# Patient Record
Sex: MALE | Race: WHITE | NOT HISPANIC OR LATINO | URBAN - METROPOLITAN AREA
[De-identification: names, ages, dates, MRNs, and addresses within clinical notes are randomized per-mention and may not be internally consistent; named-entity substitution may affect disease eponyms.]

---

## 2017-03-04 ENCOUNTER — EMERGENCY (EMERGENCY)
Facility: HOSPITAL | Age: 82
LOS: 1 days | Discharge: HOME | End: 2017-03-04
Attending: EMERGENCY MEDICINE
Payer: MEDICARE

## 2017-03-04 VITALS
TEMPERATURE: 102 F | HEART RATE: 90 BPM | SYSTOLIC BLOOD PRESSURE: 207 MMHG | HEIGHT: 66 IN | WEIGHT: 169.98 LBS | OXYGEN SATURATION: 98 % | DIASTOLIC BLOOD PRESSURE: 91 MMHG | RESPIRATION RATE: 20 BRPM

## 2017-03-04 DIAGNOSIS — Z95.5 PRESENCE OF CORONARY ANGIOPLASTY IMPLANT AND GRAFT: ICD-10-CM

## 2017-03-04 DIAGNOSIS — Z95.1 PRESENCE OF AORTOCORONARY BYPASS GRAFT: Chronic | ICD-10-CM

## 2017-03-04 DIAGNOSIS — J09.X2 INFLUENZA DUE TO IDENTIFIED NOVEL INFLUENZA A VIRUS WITH OTHER RESPIRATORY MANIFESTATIONS: ICD-10-CM

## 2017-03-04 DIAGNOSIS — T82.898A OTHER SPECIFIED COMPLICATION OF VASCULAR PROSTHETIC DEVICES, IMPLANTS AND GRAFTS, INITIAL ENCOUNTER: Chronic | ICD-10-CM

## 2017-03-04 LAB
ALBUMIN SERPL ELPH-MCNC: 4.2 G/DL — SIGNIFICANT CHANGE UP (ref 3.3–5.2)
ALP SERPL-CCNC: 104 U/L — SIGNIFICANT CHANGE UP (ref 40–120)
ALT FLD-CCNC: 11 U/L — SIGNIFICANT CHANGE UP
ANION GAP SERPL CALC-SCNC: 14 MMOL/L — SIGNIFICANT CHANGE UP (ref 5–17)
APPEARANCE UR: CLEAR — SIGNIFICANT CHANGE UP
APTT BLD: 29.6 SEC — SIGNIFICANT CHANGE UP (ref 27.5–37.4)
AST SERPL-CCNC: 19 U/L — SIGNIFICANT CHANGE UP
BACTERIA # UR AUTO: ABNORMAL
BILIRUB SERPL-MCNC: 0.6 MG/DL — SIGNIFICANT CHANGE UP (ref 0.4–2)
BILIRUB UR-MCNC: NEGATIVE — SIGNIFICANT CHANGE UP
BUN SERPL-MCNC: 36 MG/DL — HIGH (ref 8–20)
CALCIUM SERPL-MCNC: 9.6 MG/DL — SIGNIFICANT CHANGE UP (ref 8.6–10.2)
CHLORIDE SERPL-SCNC: 103 MMOL/L — SIGNIFICANT CHANGE UP (ref 98–107)
CO2 SERPL-SCNC: 23 MMOL/L — SIGNIFICANT CHANGE UP (ref 22–29)
COLOR SPEC: YELLOW — SIGNIFICANT CHANGE UP
CREAT SERPL-MCNC: 2.04 MG/DL — HIGH (ref 0.5–1.3)
DIFF PNL FLD: ABNORMAL
EOSINOPHIL NFR BLD AUTO: 1 % — SIGNIFICANT CHANGE UP (ref 0–6)
FLUAV H1 2009 PAND RNA SPEC QL NAA+PROBE: DETECTED
GLUCOSE SERPL-MCNC: 80 MG/DL — SIGNIFICANT CHANGE UP (ref 70–115)
GLUCOSE UR QL: NEGATIVE MG/DL — SIGNIFICANT CHANGE UP
GRAN CASTS # UR COMP ASSIST: ABNORMAL /LPF
HCT VFR BLD CALC: 39.2 % — LOW (ref 42–52)
HGB BLD-MCNC: 12.9 G/DL — LOW (ref 14–18)
INR BLD: 1.41 RATIO — HIGH (ref 0.88–1.16)
KETONES UR-MCNC: NEGATIVE — SIGNIFICANT CHANGE UP
LEUKOCYTE ESTERASE UR-ACNC: NEGATIVE — SIGNIFICANT CHANGE UP
LYMPHOCYTES # BLD AUTO: 12 % — LOW (ref 20–55)
MCHC RBC-ENTMCNC: 30.9 PG — SIGNIFICANT CHANGE UP (ref 27–31)
MCHC RBC-ENTMCNC: 32.9 G/DL — SIGNIFICANT CHANGE UP (ref 32–36)
MCV RBC AUTO: 94 FL — SIGNIFICANT CHANGE UP (ref 80–94)
METAMYELOCYTES # FLD: 1 % — HIGH (ref 0–0)
MONOCYTES NFR BLD AUTO: 6 % — SIGNIFICANT CHANGE UP (ref 3–10)
NEUTROPHILS NFR BLD AUTO: 71 % — SIGNIFICANT CHANGE UP (ref 37–73)
NEUTS BAND # BLD: 7 % — SIGNIFICANT CHANGE UP (ref 0–8)
NITRITE UR-MCNC: NEGATIVE — SIGNIFICANT CHANGE UP
PH UR: 5 — SIGNIFICANT CHANGE UP (ref 4.8–8)
PLAT MORPH BLD: NORMAL — SIGNIFICANT CHANGE UP
PLATELET # BLD AUTO: 214 K/UL — SIGNIFICANT CHANGE UP (ref 150–400)
POTASSIUM SERPL-MCNC: 5.3 MMOL/L — SIGNIFICANT CHANGE UP (ref 3.5–5.3)
POTASSIUM SERPL-SCNC: 5.3 MMOL/L — SIGNIFICANT CHANGE UP (ref 3.5–5.3)
PROT SERPL-MCNC: 7.8 G/DL — SIGNIFICANT CHANGE UP (ref 6.6–8.7)
PROT UR-MCNC: 30 MG/DL
PROTHROM AB SERPL-ACNC: 15.6 SEC — HIGH (ref 10–13.1)
RAPID RVP RESULT: DETECTED
RBC # BLD: 4.17 M/UL — LOW (ref 4.6–6.2)
RBC # FLD: 12.6 % — SIGNIFICANT CHANGE UP (ref 11–15.6)
RBC BLD AUTO: NORMAL — SIGNIFICANT CHANGE UP
SODIUM SERPL-SCNC: 140 MMOL/L — SIGNIFICANT CHANGE UP (ref 135–145)
SP GR SPEC: 1.01 — SIGNIFICANT CHANGE UP (ref 1.01–1.02)
UROBILINOGEN FLD QL: NEGATIVE MG/DL — SIGNIFICANT CHANGE UP
VARIANT LYMPHS # BLD: 2 % — SIGNIFICANT CHANGE UP (ref 0–6)
WBC # BLD: 5.9 K/UL — SIGNIFICANT CHANGE UP (ref 4.8–10.8)
WBC # FLD AUTO: 5.9 K/UL — SIGNIFICANT CHANGE UP (ref 4.8–10.8)
WBC UR QL: SIGNIFICANT CHANGE UP

## 2017-03-04 PROCEDURE — 71010: CPT | Mod: 26

## 2017-03-04 PROCEDURE — 74176 CT ABD & PELVIS W/O CONTRAST: CPT

## 2017-03-04 PROCEDURE — 87581 M.PNEUMON DNA AMP PROBE: CPT

## 2017-03-04 PROCEDURE — 74176 CT ABD & PELVIS W/O CONTRAST: CPT | Mod: 26

## 2017-03-04 PROCEDURE — 87040 BLOOD CULTURE FOR BACTERIA: CPT

## 2017-03-04 PROCEDURE — 99284 EMERGENCY DEPT VISIT MOD MDM: CPT | Mod: 25

## 2017-03-04 PROCEDURE — 80053 COMPREHEN METABOLIC PANEL: CPT

## 2017-03-04 PROCEDURE — 81001 URINALYSIS AUTO W/SCOPE: CPT

## 2017-03-04 PROCEDURE — 87633 RESP VIRUS 12-25 TARGETS: CPT

## 2017-03-04 PROCEDURE — 87086 URINE CULTURE/COLONY COUNT: CPT

## 2017-03-04 PROCEDURE — 85730 THROMBOPLASTIN TIME PARTIAL: CPT

## 2017-03-04 PROCEDURE — 85027 COMPLETE CBC AUTOMATED: CPT

## 2017-03-04 PROCEDURE — 71045 X-RAY EXAM CHEST 1 VIEW: CPT

## 2017-03-04 PROCEDURE — 99284 EMERGENCY DEPT VISIT MOD MDM: CPT

## 2017-03-04 PROCEDURE — 87486 CHLMYD PNEUM DNA AMP PROBE: CPT

## 2017-03-04 PROCEDURE — 87798 DETECT AGENT NOS DNA AMP: CPT

## 2017-03-04 PROCEDURE — 85610 PROTHROMBIN TIME: CPT

## 2017-03-04 RX ORDER — ACETAMINOPHEN 500 MG
975 TABLET ORAL ONCE
Qty: 0 | Refills: 0 | Status: COMPLETED | OUTPATIENT
Start: 2017-03-04 | End: 2017-03-04

## 2017-03-04 RX ORDER — SODIUM CHLORIDE 9 MG/ML
1000 INJECTION INTRAMUSCULAR; INTRAVENOUS; SUBCUTANEOUS ONCE
Qty: 0 | Refills: 0 | Status: COMPLETED | OUTPATIENT
Start: 2017-03-04 | End: 2017-03-04

## 2017-03-04 RX ORDER — ACETAMINOPHEN 500 MG
3 TABLET ORAL
Qty: 100 | Refills: 0 | OUTPATIENT
Start: 2017-03-04

## 2017-03-04 RX ORDER — SODIUM CHLORIDE 9 MG/ML
2000 INJECTION INTRAMUSCULAR; INTRAVENOUS; SUBCUTANEOUS ONCE
Qty: 0 | Refills: 0 | Status: COMPLETED | OUTPATIENT
Start: 2017-03-04 | End: 2017-03-04

## 2017-03-04 RX ADMIN — Medication 975 MILLIGRAM(S): at 22:20

## 2017-03-04 RX ADMIN — SODIUM CHLORIDE 1000 MILLILITER(S): 9 INJECTION INTRAMUSCULAR; INTRAVENOUS; SUBCUTANEOUS at 16:40

## 2017-03-04 RX ADMIN — Medication 75 MILLIGRAM(S): at 22:20

## 2017-03-04 RX ADMIN — SODIUM CHLORIDE 2000 MILLILITER(S): 9 INJECTION INTRAMUSCULAR; INTRAVENOUS; SUBCUTANEOUS at 22:21

## 2017-03-04 NOTE — ED PROVIDER NOTE - PROGRESS NOTE DETAILS
received signout from Dr. Gates. pt found to have influenza. Therefore, we start tamiflu, give antipyretics, and iv hydration. pt daughter advised to have pt follow up with PMD regarding renal insufficiency. no electrolyte abnormalities

## 2017-03-04 NOTE — ED ADULT NURSE REASSESSMENT NOTE - NS ED NURSE REASSESS COMMENT FT1
received report from sha yang and assumed care of pt. pt sitting calm in bed. a and o x3. johnson. breathing even and unlabored. b/l ronchi to all fields. cap refill brisk. skin w/d/i. + and = peripheral pulses. no le edema. nsr on cm. abdomen soft nontender nondistended. pt has no complaints at this time. awaiting results for dispo. will continue to monitor.
pt  with daughter at bedside s/p po contrast awaiting CT. pt in no distress at this time will continue to monitor

## 2017-03-04 NOTE — ED PROVIDER NOTE - OBJECTIVE STATEMENT
89 yo wm pmh with bodyaches and "shakes :"  for the last week; pt also noted lower abdominal pain ; fever in ed; no vomiting or diarrhea

## 2017-03-04 NOTE — ED ADULT TRIAGE NOTE - CHIEF COMPLAINT QUOTE
pt alert and awake, normal baseline, BIBA c/o hands "shaking" states has been going on for a week, denies chest pain/sob, denies fever/chills.

## 2017-03-05 VITALS
SYSTOLIC BLOOD PRESSURE: 135 MMHG | RESPIRATION RATE: 16 BRPM | HEART RATE: 84 BPM | DIASTOLIC BLOOD PRESSURE: 84 MMHG | TEMPERATURE: 98 F | OXYGEN SATURATION: 98 %

## 2017-03-05 LAB
CULTURE RESULTS: NO GROWTH — SIGNIFICANT CHANGE UP
SPECIMEN SOURCE: SIGNIFICANT CHANGE UP

## 2017-03-09 LAB
CULTURE RESULTS: SIGNIFICANT CHANGE UP
CULTURE RESULTS: SIGNIFICANT CHANGE UP
SPECIMEN SOURCE: SIGNIFICANT CHANGE UP
SPECIMEN SOURCE: SIGNIFICANT CHANGE UP

## 2017-08-02 ENCOUNTER — EMERGENCY (EMERGENCY)
Facility: HOSPITAL | Age: 82
LOS: 1 days | Discharge: HOME | End: 2017-08-02
Attending: EMERGENCY MEDICINE
Payer: MEDICARE

## 2017-08-02 VITALS
HEART RATE: 58 BPM | OXYGEN SATURATION: 96 % | SYSTOLIC BLOOD PRESSURE: 207 MMHG | WEIGHT: 130.07 LBS | DIASTOLIC BLOOD PRESSURE: 99 MMHG | RESPIRATION RATE: 18 BRPM | HEIGHT: 66 IN

## 2017-08-02 VITALS
SYSTOLIC BLOOD PRESSURE: 188 MMHG | HEART RATE: 82 BPM | DIASTOLIC BLOOD PRESSURE: 96 MMHG | RESPIRATION RATE: 24 BRPM | OXYGEN SATURATION: 97 %

## 2017-08-02 DIAGNOSIS — Z95.1 PRESENCE OF AORTOCORONARY BYPASS GRAFT: Chronic | ICD-10-CM

## 2017-08-02 DIAGNOSIS — T82.898A OTHER SPECIFIED COMPLICATION OF VASCULAR PROSTHETIC DEVICES, IMPLANTS AND GRAFTS, INITIAL ENCOUNTER: Chronic | ICD-10-CM

## 2017-08-02 PROCEDURE — 71045 X-RAY EXAM CHEST 1 VIEW: CPT

## 2017-08-02 PROCEDURE — 71010: CPT | Mod: 26

## 2017-08-02 PROCEDURE — 99283 EMERGENCY DEPT VISIT LOW MDM: CPT

## 2017-08-02 PROCEDURE — 99283 EMERGENCY DEPT VISIT LOW MDM: CPT | Mod: 25

## 2017-08-02 NOTE — ED PROVIDER NOTE - OBJECTIVE STATEMENT
89 yo M presents to ED with daughter s/p reported choking episode at [facility] approx 1400 today.  Pt was eating cake when he choked, but was able to "spit it out".  Pt has been his usual self since.  Pt denies any assoc abd pain, SOB, chest pain or N/V.  Pt denies any other c/o

## 2017-08-02 NOTE — ED ADULT TRIAGE NOTE - CHIEF COMPLAINT QUOTE
Patient BIBA, states was choking on piece of cake earlier, has resolved at this time however states still feels like "coughing", patient appears well, not able to tolerate secretions, seen by Dr Osorio upon ED arrival

## 2017-08-02 NOTE — ED ADULT NURSE NOTE - OBJECTIVE STATEMENT
Pt A&OX3, amb ad rocío, states he was at adult day care today and choked with cake this morning.  Pt states he had been drooling and was having pain with difficulty swallowing.  At time of my assessment, pt denies any pain and no drooling noted.  O2 sat of 97% on RA.  NAD.  Clear bsb, no stridor, abd soft nondistended, nontender, moving all ext well.

## 2017-08-02 NOTE — ED PROVIDER NOTE - CONSTITUTIONAL, MLM
normal... Well appearing, well nourished, elderly M awake, alert, oriented to person, place, time/situation and in no apparent distress

## 2017-08-04 NOTE — ED POST DISCHARGE NOTE - RESULT SUMMARY
LLL opacity/infiltrate- Pt feels well. No fevers, cough or SOB. pt instructed to f/u PMD for a repeat CXR

## 2018-04-11 ENCOUNTER — EMERGENCY (EMERGENCY)
Facility: HOSPITAL | Age: 83
LOS: 1 days | Discharge: HOME | End: 2018-04-11
Attending: EMERGENCY MEDICINE
Payer: MEDICARE

## 2018-04-11 VITALS
SYSTOLIC BLOOD PRESSURE: 182 MMHG | TEMPERATURE: 99 F | OXYGEN SATURATION: 98 % | DIASTOLIC BLOOD PRESSURE: 64 MMHG | RESPIRATION RATE: 18 BRPM | HEART RATE: 65 BPM

## 2018-04-11 VITALS
DIASTOLIC BLOOD PRESSURE: 71 MMHG | OXYGEN SATURATION: 99 % | WEIGHT: 169.98 LBS | RESPIRATION RATE: 18 BRPM | HEIGHT: 67 IN | SYSTOLIC BLOOD PRESSURE: 198 MMHG | HEART RATE: 60 BPM | TEMPERATURE: 98 F

## 2018-04-11 DIAGNOSIS — Z23 ENCOUNTER FOR IMMUNIZATION: ICD-10-CM

## 2018-04-11 DIAGNOSIS — Z95.5 PRESENCE OF CORONARY ANGIOPLASTY IMPLANT AND GRAFT: ICD-10-CM
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
## 2018-04-11 DIAGNOSIS — S01.111A LACERATION WITHOUT FOREIGN BODY OF RIGHT EYELID AND PERIOCULAR AREA, INITIAL ENCOUNTER: ICD-10-CM

## 2018-04-11 DIAGNOSIS — Z79.891 LONG TERM (CURRENT) USE OF OPIATE ANALGESIC: ICD-10-CM

## 2018-04-11 DIAGNOSIS — S09.90XA UNSPECIFIED INJURY OF HEAD, INITIAL ENCOUNTER: ICD-10-CM

## 2018-04-11 DIAGNOSIS — S50.311A ABRASION OF RIGHT ELBOW, INITIAL ENCOUNTER: ICD-10-CM
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
## 2018-04-11 DIAGNOSIS — Z95.1 PRESENCE OF AORTOCORONARY BYPASS GRAFT: Chronic | ICD-10-CM
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
## 2018-04-11 DIAGNOSIS — R01.1 CARDIAC MURMUR, UNSPECIFIED: ICD-10-CM

## 2018-04-11 DIAGNOSIS — S80.211A ABRASION, RIGHT KNEE, INITIAL ENCOUNTER: ICD-10-CM

## 2018-04-11 DIAGNOSIS — T82.898A OTHER SPECIFIED COMPLICATION OF VASCULAR PROSTHETIC DEVICES, IMPLANTS AND GRAFTS, INITIAL ENCOUNTER: Chronic | ICD-10-CM

## 2018-04-11 PROCEDURE — 72125 CT NECK SPINE W/O DYE: CPT

## 2018-04-11 PROCEDURE — 73070 X-RAY EXAM OF ELBOW: CPT

## 2018-04-11 PROCEDURE — 72125 CT NECK SPINE W/O DYE: CPT | Mod: 26

## 2018-04-11 PROCEDURE — 99284 EMERGENCY DEPT VISIT MOD MDM: CPT | Mod: 25

## 2018-04-11 PROCEDURE — 12013 RPR F/E/E/N/L/M 2.6-5.0 CM: CPT

## 2018-04-11 PROCEDURE — 90471 IMMUNIZATION ADMIN: CPT

## 2018-04-11 PROCEDURE — 73070 X-RAY EXAM OF ELBOW: CPT | Mod: 26,RT

## 2018-04-11 PROCEDURE — 90715 TDAP VACCINE 7 YRS/> IM: CPT

## 2018-04-11 PROCEDURE — 70450 CT HEAD/BRAIN W/O DYE: CPT

## 2018-04-11 PROCEDURE — 70450 CT HEAD/BRAIN W/O DYE: CPT | Mod: 26

## 2018-04-11 RX ORDER — TETANUS TOXOID, REDUCED DIPHTHERIA TOXOID AND ACELLULAR PERTUSSIS VACCINE, ADSORBED 5; 2.5; 8; 8; 2.5 [IU]/.5ML; [IU]/.5ML; UG/.5ML; UG/.5ML; UG/.5ML
0.5 SUSPENSION INTRAMUSCULAR ONCE
Qty: 0 | Refills: 0 | Status: COMPLETED | OUTPATIENT
Start: 2018-04-11 | End: 2018-04-11

## 2018-04-11 RX ADMIN — TETANUS TOXOID, REDUCED DIPHTHERIA TOXOID AND ACELLULAR PERTUSSIS VACCINE, ADSORBED 0.5 MILLILITER(S): 5; 2.5; 8; 8; 2.5 SUSPENSION INTRAMUSCULAR at 09:51

## 2018-04-11 NOTE — ED PROVIDER NOTE - CONSTITUTIONAL, MLM
normal... Well appearing, well nourished, awake, alert, oriented to person, place, time/situation and in no apparent distress. elderly male

## 2018-04-11 NOTE — ED PROVIDER NOTE - PROGRESS NOTE DETAILS
CT and X-ray results as noted.  Laceration repaired by PA with good closure/hemostasis.  Pt's daughter in ED to take pt home.  Rx head injury instructions, wound care instructions and suture removal in 5 days with PMD/Dr. Roth.  Rx Tylenol prn pain

## 2018-04-11 NOTE — ED ADULT TRIAGE NOTE - CHIEF COMPLAINT QUOTE
pt alert and awake x3, BIBA s/p fall from standing height, pt states, I tripped and fell and kissed the pavement, denies LOC, denies blood thinners.

## 2018-04-11 NOTE — ED PROVIDER NOTE - CARE PLAN
Principal Discharge DX:	Fall  Secondary Diagnosis:	Head injuries, initial encounter  Secondary Diagnosis:	Facial laceration, initial encounter

## 2018-04-11 NOTE — ED PROVIDER NOTE - OBJECTIVE STATEMENT
89 y/o M pt with hx of cataract surgery presents to ED c/o head pain and right elbow pain s/p trip and fall in driveway. no neck pain, visual changes, abdominal pain. Nonsmoker, no EtOH. Pt had breakfast today.  PMD: Dr. Roth

## 2018-04-16 ENCOUNTER — TRANSCRIPTION ENCOUNTER (OUTPATIENT)
Age: 83
End: 2018-04-16

## 2019-02-05 ENCOUNTER — INPATIENT (INPATIENT)
Facility: HOSPITAL | Age: 84
LOS: 6 days | Discharge: HOME | DRG: 689 | End: 2019-02-12
Attending: HOSPITALIST | Admitting: HOSPITALIST
Payer: MEDICARE

## 2019-02-05 VITALS
DIASTOLIC BLOOD PRESSURE: 74 MMHG | HEIGHT: 69 IN | RESPIRATION RATE: 20 BRPM | SYSTOLIC BLOOD PRESSURE: 185 MMHG | HEART RATE: 70 BPM | OXYGEN SATURATION: 99 % | TEMPERATURE: 98 F | WEIGHT: 139.99 LBS

## 2019-02-05 DIAGNOSIS — Z95.1 PRESENCE OF AORTOCORONARY BYPASS GRAFT: Chronic | ICD-10-CM

## 2019-02-05 DIAGNOSIS — T82.898A OTHER SPECIFIED COMPLICATION OF VASCULAR PROSTHETIC DEVICES, IMPLANTS AND GRAFTS, INITIAL ENCOUNTER: Chronic | ICD-10-CM

## 2019-02-05 LAB
ACETONE SERPL-MCNC: NEGATIVE — SIGNIFICANT CHANGE UP
ALBUMIN SERPL ELPH-MCNC: 3.7 G/DL — SIGNIFICANT CHANGE UP (ref 3.3–5.2)
ALP SERPL-CCNC: 126 U/L — HIGH (ref 40–120)
ALT FLD-CCNC: 11 U/L — SIGNIFICANT CHANGE UP
ANION GAP SERPL CALC-SCNC: 12 MMOL/L — SIGNIFICANT CHANGE UP (ref 5–17)
APPEARANCE UR: CLEAR — SIGNIFICANT CHANGE UP
AST SERPL-CCNC: 9 U/L — SIGNIFICANT CHANGE UP
BASOPHILS # BLD AUTO: 0 K/UL — SIGNIFICANT CHANGE UP (ref 0–0.2)
BASOPHILS NFR BLD AUTO: 0.2 % — SIGNIFICANT CHANGE UP (ref 0–2)
BILIRUB SERPL-MCNC: 0.3 MG/DL — LOW (ref 0.4–2)
BILIRUB UR-MCNC: NEGATIVE — SIGNIFICANT CHANGE UP
BUN SERPL-MCNC: 46 MG/DL — HIGH (ref 8–20)
CALCIUM SERPL-MCNC: 9 MG/DL — SIGNIFICANT CHANGE UP (ref 8.6–10.2)
CHLORIDE SERPL-SCNC: 94 MMOL/L — LOW (ref 98–107)
CK SERPL-CCNC: 60 U/L — SIGNIFICANT CHANGE UP (ref 30–200)
CO2 SERPL-SCNC: 24 MMOL/L — SIGNIFICANT CHANGE UP (ref 22–29)
COLOR SPEC: YELLOW — SIGNIFICANT CHANGE UP
CREAT SERPL-MCNC: 2.5 MG/DL — HIGH (ref 0.5–1.3)
DIFF PNL FLD: ABNORMAL
EOSINOPHIL # BLD AUTO: 0.1 K/UL — SIGNIFICANT CHANGE UP (ref 0–0.5)
EOSINOPHIL NFR BLD AUTO: 1.4 % — SIGNIFICANT CHANGE UP (ref 0–5)
EPI CELLS # UR: SIGNIFICANT CHANGE UP
GAS PNL BLDV: SIGNIFICANT CHANGE UP
GLUCOSE SERPL-MCNC: 679 MG/DL — CRITICAL HIGH (ref 70–115)
GLUCOSE UR QL: 1000 MG/DL
HCO3 BLDV-SCNC: 23 MMOL/L — SIGNIFICANT CHANGE UP (ref 21–29)
HCT VFR BLD CALC: 36.8 % — LOW (ref 42–52)
HGB BLD-MCNC: 12.3 G/DL — LOW (ref 14–18)
KETONES UR-MCNC: NEGATIVE — SIGNIFICANT CHANGE UP
LEUKOCYTE ESTERASE UR-ACNC: ABNORMAL
LIDOCAIN IGE QN: 105 U/L — HIGH (ref 22–51)
LYMPHOCYTES # BLD AUTO: 0.9 K/UL — LOW (ref 1–4.8)
LYMPHOCYTES # BLD AUTO: 15.5 % — LOW (ref 20–55)
MAGNESIUM SERPL-MCNC: 2.4 MG/DL — SIGNIFICANT CHANGE UP (ref 1.6–2.6)
MCHC RBC-ENTMCNC: 30.3 PG — SIGNIFICANT CHANGE UP (ref 27–31)
MCHC RBC-ENTMCNC: 33.4 G/DL — SIGNIFICANT CHANGE UP (ref 32–36)
MCV RBC AUTO: 90.6 FL — SIGNIFICANT CHANGE UP (ref 80–94)
MONOCYTES # BLD AUTO: 0.4 K/UL — SIGNIFICANT CHANGE UP (ref 0–0.8)
MONOCYTES NFR BLD AUTO: 7.7 % — SIGNIFICANT CHANGE UP (ref 3–10)
NEUTROPHILS # BLD AUTO: 4.4 K/UL — SIGNIFICANT CHANGE UP (ref 1.8–8)
NEUTROPHILS NFR BLD AUTO: 73.8 % — HIGH (ref 37–73)
NITRITE UR-MCNC: NEGATIVE — SIGNIFICANT CHANGE UP
PCO2 BLDV: 56 MMHG — HIGH (ref 35–50)
PH BLDV: 7.28 — LOW (ref 7.32–7.43)
PH UR: 7 — SIGNIFICANT CHANGE UP (ref 5–8)
PLATELET # BLD AUTO: 226 K/UL — SIGNIFICANT CHANGE UP (ref 150–400)
PO2 BLDV: 41 MMHG — SIGNIFICANT CHANGE UP (ref 25–45)
POTASSIUM SERPL-MCNC: 5.2 MMOL/L — SIGNIFICANT CHANGE UP (ref 3.5–5.3)
POTASSIUM SERPL-SCNC: 5.2 MMOL/L — SIGNIFICANT CHANGE UP (ref 3.5–5.3)
PROT SERPL-MCNC: 7.3 G/DL — SIGNIFICANT CHANGE UP (ref 6.6–8.7)
PROT UR-MCNC: 30 MG/DL
RBC # BLD: 4.06 M/UL — LOW (ref 4.6–6.2)
RBC # FLD: 12.5 % — SIGNIFICANT CHANGE UP (ref 11–15.6)
RBC CASTS # UR COMP ASSIST: SIGNIFICANT CHANGE UP /HPF (ref 0–4)
SAO2 % BLDV: 77 % — SIGNIFICANT CHANGE UP
SODIUM SERPL-SCNC: 130 MMOL/L — LOW (ref 135–145)
SP GR SPEC: 1 — LOW (ref 1.01–1.02)
TSH SERPL-MCNC: 0.8 UIU/ML — SIGNIFICANT CHANGE UP (ref 0.27–4.2)
UROBILINOGEN FLD QL: NEGATIVE MG/DL — SIGNIFICANT CHANGE UP
WBC # BLD: 5.9 K/UL — SIGNIFICANT CHANGE UP (ref 4.8–10.8)
WBC # FLD AUTO: 5.9 K/UL — SIGNIFICANT CHANGE UP (ref 4.8–10.8)
WBC UR QL: ABNORMAL

## 2019-02-05 PROCEDURE — 99285 EMERGENCY DEPT VISIT HI MDM: CPT

## 2019-02-05 RX ORDER — SODIUM CHLORIDE 9 MG/ML
1000 INJECTION INTRAMUSCULAR; INTRAVENOUS; SUBCUTANEOUS ONCE
Qty: 0 | Refills: 0 | Status: COMPLETED | OUTPATIENT
Start: 2019-02-05 | End: 2019-02-05

## 2019-02-05 RX ORDER — INSULIN HUMAN 100 [IU]/ML
12 INJECTION, SOLUTION SUBCUTANEOUS ONCE
Qty: 0 | Refills: 0 | Status: COMPLETED | OUTPATIENT
Start: 2019-02-05 | End: 2019-02-05

## 2019-02-05 RX ADMIN — INSULIN HUMAN 12 UNIT(S): 100 INJECTION, SOLUTION SUBCUTANEOUS at 22:34

## 2019-02-05 RX ADMIN — SODIUM CHLORIDE 2000 MILLILITER(S): 9 INJECTION INTRAMUSCULAR; INTRAVENOUS; SUBCUTANEOUS at 21:00

## 2019-02-05 NOTE — ED PROVIDER NOTE - PROGRESS NOTE DETAILS
Spoke with daughter, noticed acute change in mental status over the past week, lost glucometer, not taking meds. Pt not safe to go home as per daughter. I agree with daughter's assessment.

## 2019-02-05 NOTE — ED ADULT NURSE NOTE - OBJECTIVE STATEMENT
Pt. brought in by daughter for intermittent confusion at night over the last few weeks worsening today.  Daughter brought pt. to urgent care and sugar found to be HI and wouldn't read.  Pt. brought in by daughter following urgent care.

## 2019-02-05 NOTE — ED ADULT TRIAGE NOTE - CHIEF COMPLAINT QUOTE
Pt BIBA from urgent care for hyperglycemia.  As per EMS BS read HIGH.  Daughter brought pt to  for recent episodes of confusion.  Pt currently A&Ox2.  Denies physical complaints.

## 2019-02-05 NOTE — ED PROVIDER NOTE - OBJECTIVE STATEMENT
A 89 year old male pt with a hx of diabetes, on insulin, presents to the ED c/o AMS, hyperglycemia. As per the pt's daughter, the pt was found to not be acting his normal self today and was taken to urgent care for glucose check. Pt was found to have glucose reading of "high" and was brought to the ED. In the ED, the pt is acting normally and admits to not taking his insulin properly over the past few days. denies fever. denies HA or neck pain. no chest pain or sob. no abd pain. no n/v/d. no urinary f/u/d. no back pain. no motor or sensory deficits. denies illicit drug use. no recent travel. no rash. no other acute issues symptoms or concerns

## 2019-02-05 NOTE — ED PROVIDER NOTE - NEUROLOGICAL, MLM
neuro: CN II - XII intact, EOMI, PERRL, no papilledema, 5/5 muscle strength x 4 extremities, no sensory deficits, 2+ dtr globally, negative babinski, no ataxic gait, normal MARCIAL and FNT, normal romberg

## 2019-02-05 NOTE — ED ADULT NURSE NOTE - NS ED NOTE ABUSE SUSPICION NEGLECT YN
Verified Results  COMP METABOLIC PNL 09Mar2018 12:01AM [facility]     Test Name Result Flag Reference   SODIUM 145 mmol/L  135-145   POTASSIUM 4.1 mmol/L  3.4-5.1   CHLORIDE 109 mmol/L H    CARBON DIOXIDE 28 mmol/L  21-32   ANION GAP 12 mmol/L  10-20   GLUCOSE 94 mg/dl  65-99   BUN 16 mg/dl  6-20   CREATININE 0.80 mg/dl  0.51-0.95   GFR EST.AFRICAN AMER 77     eGFR 60 - 89 mL/min/1.73m2 = Mild decrease in kidney function.   GFR EST.NONAFRI AMER 67     eGFR 60 - 89 mL/min/1.73m2 = Mild decrease in kidney function.   BUN/CREATININE RATIO 20  7-25   BILIRUBIN TOTAL 0.5 mg/dl  0.2-1.0   GOT/AST 16 Units/L  <38   ALKALINE PHOSPHATASE 71 Units/L     ALBUMIN 3.9 g/dl  3.6-5.1   TOTAL PROTEIN 7.2 g/dl  6.4-8.2   GLOBULIN (CALCULATED) 3.3 g/dl  2.0-4.0   A/G RATIO 1.2  1.0-2.4   CALCIUM 9.7 mg/dl  8.4-10.2   GPT/ALT 20 Units/L  <79   FASTING STATUS UNKNOWN hrs       CBC WITHOUT DIFFERENTIAL 09Mar2018 12:01AM [facility] JULI[facility]     Test Name Result Flag Reference   RDW-CV 13.8 %  11.0-15.0   PLATELET COUNT 274 K/mcL  140-450   WHITE BLOOD COUNT 7.4 K/mcL  4.2-11.0   RED CELL COUNT 4.59 mil/mcL  4.00-5.20   HEMOGLOBIN 13.2 g/dl  12.0-15.5   HEMATOCRIT 41.8 %  36.0-46.5   MEAN CORPUSCULAR VOLUME 91.1 fL  78.0-100.0   MEAN CORPUSCULAR HEMOGLOBIN 28.8 pg  26.0-34.0   MEAN CORPUSCULAR HGB CONC 31.6 g/dl L 32.0-36.5       
No

## 2019-02-06 DIAGNOSIS — R41.82 ALTERED MENTAL STATUS, UNSPECIFIED: ICD-10-CM

## 2019-02-06 PROBLEM — E11.9 TYPE 2 DIABETES MELLITUS WITHOUT COMPLICATIONS: Chronic | Status: ACTIVE | Noted: 2018-04-11

## 2019-02-06 PROBLEM — N40.0 BENIGN PROSTATIC HYPERPLASIA WITHOUT LOWER URINARY TRACT SYMPTOMS: Chronic | Status: ACTIVE | Noted: 2017-03-04

## 2019-02-06 LAB
ANION GAP SERPL CALC-SCNC: 12 MMOL/L — SIGNIFICANT CHANGE UP (ref 5–17)
BUN SERPL-MCNC: 39 MG/DL — HIGH (ref 8–20)
CALCIUM SERPL-MCNC: 9 MG/DL — SIGNIFICANT CHANGE UP (ref 8.6–10.2)
CHLORIDE SERPL-SCNC: 103 MMOL/L — SIGNIFICANT CHANGE UP (ref 98–107)
CO2 SERPL-SCNC: 26 MMOL/L — SIGNIFICANT CHANGE UP (ref 22–29)
CREAT SERPL-MCNC: 2.01 MG/DL — HIGH (ref 0.5–1.3)
GLUCOSE BLDC GLUCOMTR-MCNC: 127 MG/DL — HIGH (ref 70–99)
GLUCOSE BLDC GLUCOMTR-MCNC: 163 MG/DL — HIGH (ref 70–99)
GLUCOSE BLDC GLUCOMTR-MCNC: 228 MG/DL — HIGH (ref 70–99)
GLUCOSE BLDC GLUCOMTR-MCNC: 256 MG/DL — HIGH (ref 70–99)
GLUCOSE SERPL-MCNC: 161 MG/DL — HIGH (ref 70–115)
HBA1C BLD-MCNC: 11 % — HIGH (ref 4–5.6)
HCT VFR BLD CALC: 34.9 % — LOW (ref 42–52)
HGB BLD-MCNC: 11.8 G/DL — LOW (ref 14–18)
MCHC RBC-ENTMCNC: 30.3 PG — SIGNIFICANT CHANGE UP (ref 27–31)
MCHC RBC-ENTMCNC: 33.8 G/DL — SIGNIFICANT CHANGE UP (ref 32–36)
MCV RBC AUTO: 89.5 FL — SIGNIFICANT CHANGE UP (ref 80–94)
PLATELET # BLD AUTO: 228 K/UL — SIGNIFICANT CHANGE UP (ref 150–400)
POTASSIUM SERPL-MCNC: 4.2 MMOL/L — SIGNIFICANT CHANGE UP (ref 3.5–5.3)
POTASSIUM SERPL-SCNC: 4.2 MMOL/L — SIGNIFICANT CHANGE UP (ref 3.5–5.3)
RBC # BLD: 3.9 M/UL — LOW (ref 4.6–6.2)
RBC # FLD: 12.4 % — SIGNIFICANT CHANGE UP (ref 11–15.6)
SODIUM SERPL-SCNC: 141 MMOL/L — SIGNIFICANT CHANGE UP (ref 135–145)
WBC # BLD: 5.5 K/UL — SIGNIFICANT CHANGE UP (ref 4.8–10.8)
WBC # FLD AUTO: 5.5 K/UL — SIGNIFICANT CHANGE UP (ref 4.8–10.8)

## 2019-02-06 PROCEDURE — 70450 CT HEAD/BRAIN W/O DYE: CPT | Mod: 26

## 2019-02-06 PROCEDURE — 12345: CPT | Mod: NC

## 2019-02-06 PROCEDURE — 99223 1ST HOSP IP/OBS HIGH 75: CPT | Mod: GC

## 2019-02-06 RX ORDER — CEFTRIAXONE 500 MG/1
1 INJECTION, POWDER, FOR SOLUTION INTRAMUSCULAR; INTRAVENOUS ONCE
Qty: 0 | Refills: 0 | Status: COMPLETED | OUTPATIENT
Start: 2019-02-06 | End: 2019-02-06

## 2019-02-06 RX ORDER — SODIUM CHLORIDE 9 MG/ML
1000 INJECTION, SOLUTION INTRAVENOUS
Qty: 0 | Refills: 0 | Status: DISCONTINUED | OUTPATIENT
Start: 2019-02-06 | End: 2019-02-12

## 2019-02-06 RX ORDER — DEXTROSE 50 % IN WATER 50 %
25 SYRINGE (ML) INTRAVENOUS ONCE
Qty: 0 | Refills: 0 | Status: DISCONTINUED | OUTPATIENT
Start: 2019-02-06 | End: 2019-02-12

## 2019-02-06 RX ORDER — ISOSORBIDE MONONITRATE 60 MG/1
120 TABLET, EXTENDED RELEASE ORAL DAILY
Qty: 0 | Refills: 0 | Status: DISCONTINUED | OUTPATIENT
Start: 2019-02-06 | End: 2019-02-06

## 2019-02-06 RX ORDER — GLUCAGON INJECTION, SOLUTION 0.5 MG/.1ML
1 INJECTION, SOLUTION SUBCUTANEOUS ONCE
Qty: 0 | Refills: 0 | Status: DISCONTINUED | OUTPATIENT
Start: 2019-02-06 | End: 2019-02-12

## 2019-02-06 RX ORDER — HEPARIN SODIUM 5000 [USP'U]/ML
5000 INJECTION INTRAVENOUS; SUBCUTANEOUS EVERY 8 HOURS
Qty: 0 | Refills: 0 | Status: DISCONTINUED | OUTPATIENT
Start: 2019-02-06 | End: 2019-02-12

## 2019-02-06 RX ORDER — DEXTROSE 50 % IN WATER 50 %
15 SYRINGE (ML) INTRAVENOUS ONCE
Qty: 0 | Refills: 0 | Status: DISCONTINUED | OUTPATIENT
Start: 2019-02-06 | End: 2019-02-12

## 2019-02-06 RX ORDER — INSULIN LISPRO 100/ML
VIAL (ML) SUBCUTANEOUS AT BEDTIME
Qty: 0 | Refills: 0 | Status: DISCONTINUED | OUTPATIENT
Start: 2019-02-06 | End: 2019-02-12

## 2019-02-06 RX ORDER — TAMSULOSIN HYDROCHLORIDE 0.4 MG/1
0.4 CAPSULE ORAL AT BEDTIME
Qty: 0 | Refills: 0 | Status: DISCONTINUED | OUTPATIENT
Start: 2019-02-06 | End: 2019-02-12

## 2019-02-06 RX ORDER — SACCHAROMYCES BOULARDII 250 MG
250 POWDER IN PACKET (EA) ORAL
Qty: 0 | Refills: 0 | Status: DISCONTINUED | OUTPATIENT
Start: 2019-02-06 | End: 2019-02-12

## 2019-02-06 RX ORDER — CEFTRIAXONE 500 MG/1
INJECTION, POWDER, FOR SOLUTION INTRAMUSCULAR; INTRAVENOUS
Qty: 0 | Refills: 0 | Status: DISCONTINUED | OUTPATIENT
Start: 2019-02-06 | End: 2019-02-09

## 2019-02-06 RX ORDER — SODIUM CHLORIDE 9 MG/ML
1000 INJECTION INTRAMUSCULAR; INTRAVENOUS; SUBCUTANEOUS ONCE
Qty: 0 | Refills: 0 | Status: DISCONTINUED | OUTPATIENT
Start: 2019-02-06 | End: 2019-02-06

## 2019-02-06 RX ORDER — INSULIN LISPRO 100/ML
VIAL (ML) SUBCUTANEOUS
Qty: 0 | Refills: 0 | Status: DISCONTINUED | OUTPATIENT
Start: 2019-02-06 | End: 2019-02-12

## 2019-02-06 RX ORDER — SODIUM CHLORIDE 9 MG/ML
1000 INJECTION, SOLUTION INTRAVENOUS
Qty: 0 | Refills: 0 | Status: DISCONTINUED | OUTPATIENT
Start: 2019-02-06 | End: 2019-02-10

## 2019-02-06 RX ORDER — ISOSORBIDE MONONITRATE 60 MG/1
120 TABLET, EXTENDED RELEASE ORAL DAILY
Qty: 0 | Refills: 0 | Status: DISCONTINUED | OUTPATIENT
Start: 2019-02-06 | End: 2019-02-12

## 2019-02-06 RX ORDER — INSULIN HUMAN 100 [IU]/ML
6 INJECTION, SOLUTION SUBCUTANEOUS ONCE
Qty: 0 | Refills: 0 | Status: DISCONTINUED | OUTPATIENT
Start: 2019-02-06 | End: 2019-02-06

## 2019-02-06 RX ORDER — CEFTRIAXONE 500 MG/1
1 INJECTION, POWDER, FOR SOLUTION INTRAMUSCULAR; INTRAVENOUS EVERY 24 HOURS
Qty: 0 | Refills: 0 | Status: DISCONTINUED | OUTPATIENT
Start: 2019-02-07 | End: 2019-02-09

## 2019-02-06 RX ORDER — DEXTROSE 50 % IN WATER 50 %
12.5 SYRINGE (ML) INTRAVENOUS ONCE
Qty: 0 | Refills: 0 | Status: DISCONTINUED | OUTPATIENT
Start: 2019-02-06 | End: 2019-02-12

## 2019-02-06 RX ADMIN — HEPARIN SODIUM 5000 UNIT(S): 5000 INJECTION INTRAVENOUS; SUBCUTANEOUS at 05:33

## 2019-02-06 RX ADMIN — Medication 1: at 23:46

## 2019-02-06 RX ADMIN — HEPARIN SODIUM 5000 UNIT(S): 5000 INJECTION INTRAVENOUS; SUBCUTANEOUS at 23:35

## 2019-02-06 RX ADMIN — Medication 2: at 11:07

## 2019-02-06 RX ADMIN — Medication 1: at 08:21

## 2019-02-06 RX ADMIN — SODIUM CHLORIDE 75 MILLILITER(S): 9 INJECTION, SOLUTION INTRAVENOUS at 06:06

## 2019-02-06 RX ADMIN — Medication 250 MILLIGRAM(S): at 05:34

## 2019-02-06 RX ADMIN — TAMSULOSIN HYDROCHLORIDE 0.4 MILLIGRAM(S): 0.4 CAPSULE ORAL at 23:36

## 2019-02-06 RX ADMIN — Medication 0.5 MILLIGRAM(S): at 23:35

## 2019-02-06 RX ADMIN — CEFTRIAXONE 1 GRAM(S): 500 INJECTION, POWDER, FOR SOLUTION INTRAMUSCULAR; INTRAVENOUS at 06:03

## 2019-02-06 RX ADMIN — Medication 250 MILLIGRAM(S): at 16:19

## 2019-02-06 RX ADMIN — CEFTRIAXONE 100 GRAM(S): 500 INJECTION, POWDER, FOR SOLUTION INTRAMUSCULAR; INTRAVENOUS at 05:33

## 2019-02-06 RX ADMIN — ISOSORBIDE MONONITRATE 120 MILLIGRAM(S): 60 TABLET, EXTENDED RELEASE ORAL at 11:41

## 2019-02-06 RX ADMIN — HEPARIN SODIUM 5000 UNIT(S): 5000 INJECTION INTRAVENOUS; SUBCUTANEOUS at 16:15

## 2019-02-06 NOTE — CHART NOTE - NSCHARTNOTEFT_GEN_A_CORE
Pt is seen and examined, feeling better, awake, oriented to person and place, afebrile, denied abd. pain, nausea and vomiting.    ICU Vital Signs Last 24 Hrs  T(C): 36.4 (06 Feb 2019 07:24), Max: 36.6 (05 Feb 2019 19:35)  T(F): 97.6 (06 Feb 2019 07:24), Max: 97.8 (05 Feb 2019 19:35)  HR: 79 (06 Feb 2019 07:24) (70 - 91)  BP: 147/71 (06 Feb 2019 07:24) (122/66 - 185/74)  BP(mean): --  ABP: --  ABP(mean): --  RR: 18 (06 Feb 2019 07:24) (17 - 20)  SpO2: 97% (06 Feb 2019 07:24) (97% - 99%)    PHYSICAL EXAM:    GENERAL: NAD,  NERVOUS SYSTEM:  Alert & Oriented X2 person and place  CHEST/LUNG: CTA b/l   HEART: s1/s2 audible  ABDOMEN: Soft, Nontender, Nondistended; Bowel sounds present  EXTREMITIES:  no edema  SKIN: No rashes or lesions    labs reviewed     A/P    >Metabolic encephalopathy due to UT  improving  c/w ROcephin  f/u urine cs  Pt eval    >DM- A1c 11.0 - uncontrolled   - c/w sliding scale    >Acute on CKD stage 3- improving   f/u BMP

## 2019-02-06 NOTE — H&P ADULT - NSHPREVIEWOFSYSTEMS_GEN_ALL_CORE
- General: no fever, vomiting, nausea.   - CV: denies chest pain  - RESP: Denies cough, SOB, sputum  - GI: Denies abdominal pain, diarrhea, constipation  - : Denies dysuria, hematuria.  - Neuro: AMS as mentioned above. Unable to obtain full ROS as pt confused

## 2019-02-06 NOTE — H&P ADULT - NSHPPHYSICALEXAM_GEN_ALL_CORE
Vital Signs Last 24 Hrs  T(C): 36.5 (06 Feb 2019 03:49), Max: 36.6 (05 Feb 2019 19:35)  T(F): 97.7 (06 Feb 2019 03:49), Max: 97.8 (05 Feb 2019 19:35)  HR: 91 (06 Feb 2019 03:49) (70 - 91)  BP: 122/66 (06 Feb 2019 03:49) (122/66 - 185/74)  RR: 17 (06 Feb 2019 03:49) (17 - 20)  SpO2: 97% (06 Feb 2019 03:49) (97% - 99%)    Physical exam:    CONSTITUTIONAL: Patient examined at beside. Patient is not in acute distress, seating comfortably on bed. Patient is well appearing, well nourished, awake. Patient is alert and oriented x 2 (person and place - knows he is at the hospital but don't remember the name).   ENMT: Airway patent, Nasal mucosa clear. Mouth with normal mucosa, moist mucosa. Throat has no vesicles, no oropharyngeal exudates and uvula is midline.  EYES: conjunctivae is clear bilaterally. Pupils are reactive to light and accomodation   CARDIAC: Normal rate, regular rhythm.  Heart sounds S1, S2 present. Holosystolic 3/10 murmur radiating to the neck heard on apex. PMI is visible and palpable. No rubs, no gallop.   RESPIRATORY: Torax is symmetrical, no retractions noted. Breath sounds clear and equal bilaterally.  GASTROINTESTINAL: Abdomen soft, non-tender, no guarding.  MUSCULOSKELETAL:  range of motion is not limited, no muscle or joint tenderness.  NEUROLOGICAL: neuro: 5/5 muscle strength x 4 extremities, no sensory deficits, no focal deficits.   EXTREMITIES: Symmetrical, no edema, no cyanosis, no calf tenderness. Peripheral pulses palpated b/l.  SKIN: Skin normal color for race, warm, dry and intact. No evidence of rash. Vital Signs Last 24 Hrs  T(C): 36.5 (06 Feb 2019 03:49), Max: 36.6 (05 Feb 2019 19:35)  T(F): 97.7 (06 Feb 2019 03:49), Max: 97.8 (05 Feb 2019 19:35)  HR: 91 (06 Feb 2019 03:49) (70 - 91)  BP: 122/66 (06 Feb 2019 03:49) (122/66 - 185/74)  RR: 17 (06 Feb 2019 03:49) (17 - 20)  SpO2: 97% (06 Feb 2019 03:49) (97% - 99%)    GENERAL: Elderly male, not in distress, seating comfortably on bed.   ENMT: Moist mucous membranes  EYES: conjunctivae is clear bilaterally. Extraocular movement intact.   CV Normal rate, regular rhythm.  Holosystolic murmur radiating to the neck heard on apex.  No LE edema.    RESPIRATORY: Non-labored breathing pattern.  Breath sounds clear and equal bilaterally.  GASTROINTESTINAL: Abdomen soft, non-tender, no guarding.  MUSCULOSKELETAL:  Range of motion is not limited, no muscle or joint tenderness.  NEUROLOGICAL: Awake, alert, conversant, oriented to location and self, but not year, current president, 5/5 muscle strength x 4 extremities, no sensory deficits, no focal deficits.   SKIN: Skin normal color for race, warm, dry and intact. No evidence of rash.

## 2019-02-06 NOTE — H&P ADULT - HISTORY OF PRESENT ILLNESS
89 year old male patient with PMH of T2DM on oral hypoglycemic, CAD s/p CABG and stent placement, carotid endarterectomy, BPH, gastric ulcer disease with Upper GI bleed, presenting with chief complain of altered mental status. Patient is poor historian and information was obtained partially from the patient, but mostly from chart and Daughter Marjorie.    As per daughter, the patient has been presenting episodes of worsening confusion for the last week, characterized for waking up in the middle of the night thinking it was the morning or going to bed in morning ours thinking it was night time. Yesterday, the patient did not recognized where he was and was taken by her daughter to an urgent care where hyperglycemia was noted and patient was referred to the ED. Daughter states the patient lives alone an is independent with his daily activities, also that patient has been compliant with his medication. Patient and daughter deny fever, chest pain, abdominal pain, SOB, diarrhea, vomiting, changes in frequency of urination, dysuria, changes in the urine, trauma, fall. 89 year old male patient with PMHx T2DM on oral hypoglycemic, BPH resenting with chief complain of altered mental status. Patient is poor historian (currently AOx1-2) and information was obtained partially from the patient, but mostly from chart and Daughter Marjorie.    As per daughter, the patient lives alone and is independent, however the daughter has placed cameras in his house a few months ago to help monitor him.  Daughter reports pt has been presenting episodes of worsening confusion for the last week, characterized for waking up in the middle of the night thinking it was the morning or going to bed in morning ours thinking it was night time. Over the past few days, patient did not recognize where he was which was a change for him, so earlier today pt was taken by her daughter to an urgent care where hyperglycemia was noted and patient was referred to the ED.   Patient and daughter deny fever, chest pain, abdominal pain, SOB, diarrhea, vomiting, changes in frequency of urination, dysuria, changes in the urine, trauma, fall.     In ED, pt found to FS >530 with serum glucose of 647 without anion gap and with negative acetone. He received insulin 12units with rapid improvement in FS <100 on subsequent recheck. UA also mildly positive.  Daughter denies pt being on insulin and say she helps given him his meds. CT head negative for acute intracranial abnormality. 89 year old male patient with PMHx T2DM on oral hypoglycemic, BPH resenting with chief complain of altered mental status. Patient is poor historian (currently AOx1-2) and isn't sure why he's being admitted. Hx obtained from chart and Daughter Marjorie via phone.    As per daughter, the patient lives alone and is typically independent, however the daughter has placed cameras in his house a few months ago to help monitor him.  Daughter reports pt has been presenting episodes of worsening confusion for the last week, characterized for waking up in the middle of the night thinking it was the morning or going to bed in morning ours thinking it was night time. Over the past few days, patient did not recognize where he was which was a change for him, so earlier today pt was taken by her daughter to an urgent care where hyperglycemia was noted and patient was referred to the ED.   Per daughter, pt has not reported chest pain, abdominal pain, dyspnea, diarrhea, vomiting, changes in frequency of urination, dysuria recently.     In ED, pt found to FS >530 with serum glucose of 647 without anion gap and with negative acetone. He received insulin 12units with rapid improvement in FS <100 on subsequent recheck. UA also mildly positive.  Daughter denies pt being on insulin and say she helps given him his meds. CT head negative for acute intracranial abnormality.

## 2019-02-06 NOTE — H&P ADULT - NSHPLABSRESULTS_GEN_ALL_CORE
12.3   5.9   )-----------( 226      ( 2019 20:30 )             36.8   Mean Cell Volume : 90.6 fl  Mean Cell Hemoglobin : 30.3 pg  Mean Cell Hemoglobin Concentration : 33.4 g/dL  Auto Neutrophil # : 4.4 K/uL  Auto Lymphocyte # : 0.9 K/uL  Auto Monocyte # : 0.4 K/uL  Auto Eosinophil # : 0.1 K/uL  Auto Basophil # : 0.0 K/uL  Auto Neutrophil % : 73.8 %  Auto Lymphocyte % : 15.5 %  Auto Monocyte % : 7.7 %  Auto Eosinophil % : 1.4 %  Auto Basophil % : 0.2 %        130<L>  |  94<L>  |  46.0<H>  ----------------------------<  679<HH>  5.2   |  24.0  |  2.50<H>    Ca    9.0      2019 20:30  Mg     2.4         TPro  7.3  /  Alb  3.7  /  TBili  0.3<L>  /  DBili  x   /  AST  9   /  ALT  11  /  AlkPhos  126<H>  02      Urinalysis Basic - ( 2019 23:06 )    Color: Yellow / Appearance: Clear / S.005 / pH: x  Gluc: x / Ketone: Negative  / Bili: Negative / Urobili: Negative mg/dL   Blood: x / Protein: 30 mg/dL / Nitrite: Negative   Leuk Esterase: Moderate / RBC: 0-2 /HPF / WBC 6-10   Sq Epi: x / Non Sq Epi: Few / Bacteria: x    < from: CT Head No Cont (19 @ 02:55) >    Findings:     The ventricles and sulci are prominent in size, compatible with   generalized parenchymal volume loss. No acute intracranial hemorrhage is   identified.  No extra-axial fluid collection is identified.  No mass   effect or midline shift is seen.  There is no evidence of acute   territorial infarct.  There are periventricular and subcortical white   matter hypodensities, which are nonspecific, but likely reflect   microvascular ischemic disease. Empty sella noted.  The visualized paranasal sinuses are clear. The mastoid air cells are   well aerated.  No acute osseous abnormality is seen.       Impression: No acute CT findings.    < end of copied text >

## 2019-02-06 NOTE — H&P ADULT - ASSESSMENT
89 year old male patient with PMH of T2DM on oral hypoglycemic, CAD s/p CABG and stent placement, carotid endarterectomy, BPH, gastric ulcer disease with Upper GI bleed, presenting with chief complain of altered mental status and found to be hyperglycemic, admitted for acute encephalopathy secondary to probable UTI.    Plan:    1.	Acute encephalopathy secondary in the setting of probable UTI  ·	worsening confusion in the context of a patient with risk factors for UTI (BPH, diabetes) with urine analysis showing changes concerning for infection.  ·	CT head shows no hemorrhage, fluid collection or masses.   ·	Start Ceftriaxone 1 g IV STAT , followed by Ceftriaxone 1 g IV q 24 hours.  ·	CBC + diff in AM  ·	F/U Urine culture    2.	T2 Diabetes mellitus with hyperglycemia  ·	Improving.  ·	S/p Insulin NPH 12 units SQ x 1   ·	S/p NS 1000 ml bolus.  ·	Will hold PO hypoglycemic drugs.  ·	Start with Insulin sliding scale + hypoglycemia protocol.  ·	Finger sticks pre meals.  ·	f/u HbA1c, CBC + diff, lactate.  ·	consistent carbohydrate renal diet    3.	Suspected MIKHAIL  ·	Patient with worsening kidney function compared to bun/creatinine done in previous hospitalization, probably secondary to dehydration.  ·	Will hold nephrotoxic medications.  ·	Gentle hydration with Plasmalyte at 75 cc/Hr x 14 hours.   ·	F/U BNP in Am    4.	Pseudohyponatremia  ·	Secondary to hyperglycemia.  ·	Corrected sodium is 139.    5.	Hypertension  ·	Stable BP  ·	c/w Isosorbide ED 120mg qd    6.	CAD  ·	Stable  ·	No interventions needed at this time.    7.	BPH  ·	C/w Tamsulosin 0.4 mg qd    8.	DVT prophylaxis  ·	Patient on MIKHAIL, will hold lovenox.  ·	Start Heparin 5000 units sq q8h  ·	Fall precautions.     9.	Other/ Disposition   ·	PT consult (pending)  ·	SW consult (pending) 89 year old male patient with PMH of T2DM on oral hypoglycemic, BPH presenting with episodic confusion per daughter, sent to ED after urgent care center had high reading on glucometer, found to be hyperglycemic in 600s in ED, without anion gap or acetones, resolved with insulin in ED, also with positive UA, admitted for acute encephalopathy in setting of suspected UTI     #Acute encephalopathy secondary in the setting of suspected UTI  -Likely superimposed on baseline cognitive impairment/early dementia  -CT head negative for acute abnormality  -Will start ceftriaxone   -Urine cultures pending    #Diabetes mellitus with hyperglycemia  -Unclear what precipitated such hyperglycemia as daughter reports she gives him DM meds daily   -S/p Insulin NPH 12 units SQ x 1 with rapid improvement of FS to 79, repeat is 100  -Start with Insulin sliding scale and hypoglycemia protocol  -Holding nateglinide while hospitalized  -pH on VBG 7.28, however HCO3 WNL, no anion gap, will repeat VBG    #MIKHAIL on likely CKD  -Baseline Cr appears to be 1.7 to 2 based on EMR, suspect pre-renal etiology   -Received 1L in ED, will continue with gentle hydration with Plasmalyte at 75 cc/Hr x 14 hours.   -Trend BMP    #Hyponatremia- Likely pseudohyponatremia in setting of hyperglycemia, corrected sodium is 139.    #HTN-c/w Isosorbide ED 120mg qd    #BPH-C/w Tamsulosin 0.4 mg qd    #VTE prophylaxis- Heparin 5000 units sq q8h

## 2019-02-07 DIAGNOSIS — E11.8 TYPE 2 DIABETES MELLITUS WITH UNSPECIFIED COMPLICATIONS: ICD-10-CM

## 2019-02-07 DIAGNOSIS — R41.82 ALTERED MENTAL STATUS, UNSPECIFIED: ICD-10-CM

## 2019-02-07 LAB
ANION GAP SERPL CALC-SCNC: 14 MMOL/L — SIGNIFICANT CHANGE UP (ref 5–17)
BASOPHILS # BLD AUTO: 0 K/UL — SIGNIFICANT CHANGE UP (ref 0–0.2)
BASOPHILS NFR BLD AUTO: 0.4 % — SIGNIFICANT CHANGE UP (ref 0–2)
BUN SERPL-MCNC: 40 MG/DL — HIGH (ref 8–20)
CALCIUM SERPL-MCNC: 8.9 MG/DL — SIGNIFICANT CHANGE UP (ref 8.6–10.2)
CHLORIDE SERPL-SCNC: 106 MMOL/L — SIGNIFICANT CHANGE UP (ref 98–107)
CO2 SERPL-SCNC: 23 MMOL/L — SIGNIFICANT CHANGE UP (ref 22–29)
CREAT SERPL-MCNC: 2.05 MG/DL — HIGH (ref 0.5–1.3)
EOSINOPHIL # BLD AUTO: 0.1 K/UL — SIGNIFICANT CHANGE UP (ref 0–0.5)
EOSINOPHIL NFR BLD AUTO: 1.6 % — SIGNIFICANT CHANGE UP (ref 0–6)
GLUCOSE BLDC GLUCOMTR-MCNC: 175 MG/DL — HIGH (ref 70–99)
GLUCOSE BLDC GLUCOMTR-MCNC: 184 MG/DL — HIGH (ref 70–99)
GLUCOSE BLDC GLUCOMTR-MCNC: 212 MG/DL — HIGH (ref 70–99)
GLUCOSE SERPL-MCNC: 179 MG/DL — HIGH (ref 70–115)
HBA1C BLD-MCNC: 11.5 % — HIGH (ref 4–5.6)
HCT VFR BLD CALC: 34.6 % — LOW (ref 42–52)
HGB BLD-MCNC: 11.3 G/DL — LOW (ref 14–18)
LACTATE BLDV-MCNC: 1 MMOL/L — SIGNIFICANT CHANGE UP (ref 0.5–2)
LYMPHOCYTES # BLD AUTO: 0.9 K/UL — LOW (ref 1–4.8)
LYMPHOCYTES # BLD AUTO: 15.5 % — LOW (ref 20–55)
MCHC RBC-ENTMCNC: 29.4 PG — SIGNIFICANT CHANGE UP (ref 27–31)
MCHC RBC-ENTMCNC: 32.7 G/DL — SIGNIFICANT CHANGE UP (ref 32–36)
MCV RBC AUTO: 90.1 FL — SIGNIFICANT CHANGE UP (ref 80–94)
MONOCYTES # BLD AUTO: 0.5 K/UL — SIGNIFICANT CHANGE UP (ref 0–0.8)
MONOCYTES NFR BLD AUTO: 8.1 % — SIGNIFICANT CHANGE UP (ref 3–10)
NEUTROPHILS # BLD AUTO: 4.2 K/UL — SIGNIFICANT CHANGE UP (ref 1.8–8)
NEUTROPHILS NFR BLD AUTO: 73.3 % — HIGH (ref 37–73)
PLATELET # BLD AUTO: 229 K/UL — SIGNIFICANT CHANGE UP (ref 150–400)
POTASSIUM SERPL-MCNC: 4.5 MMOL/L — SIGNIFICANT CHANGE UP (ref 3.5–5.3)
POTASSIUM SERPL-SCNC: 4.5 MMOL/L — SIGNIFICANT CHANGE UP (ref 3.5–5.3)
RBC # BLD: 3.84 M/UL — LOW (ref 4.6–6.2)
RBC # FLD: 12.3 % — SIGNIFICANT CHANGE UP (ref 11–15.6)
SODIUM SERPL-SCNC: 143 MMOL/L — SIGNIFICANT CHANGE UP (ref 135–145)
WBC # BLD: 5.7 K/UL — SIGNIFICANT CHANGE UP (ref 4.8–10.8)
WBC # FLD AUTO: 5.7 K/UL — SIGNIFICANT CHANGE UP (ref 4.8–10.8)

## 2019-02-07 PROCEDURE — 99232 SBSQ HOSP IP/OBS MODERATE 35: CPT

## 2019-02-07 RX ADMIN — HEPARIN SODIUM 5000 UNIT(S): 5000 INJECTION INTRAVENOUS; SUBCUTANEOUS at 08:18

## 2019-02-07 RX ADMIN — Medication 1: at 12:48

## 2019-02-07 RX ADMIN — HEPARIN SODIUM 5000 UNIT(S): 5000 INJECTION INTRAVENOUS; SUBCUTANEOUS at 23:17

## 2019-02-07 RX ADMIN — Medication 1: at 08:18

## 2019-02-07 RX ADMIN — Medication 250 MILLIGRAM(S): at 17:54

## 2019-02-07 RX ADMIN — TAMSULOSIN HYDROCHLORIDE 0.4 MILLIGRAM(S): 0.4 CAPSULE ORAL at 23:17

## 2019-02-07 RX ADMIN — Medication 250 MILLIGRAM(S): at 08:18

## 2019-02-07 RX ADMIN — Medication 2: at 17:53

## 2019-02-07 RX ADMIN — HEPARIN SODIUM 5000 UNIT(S): 5000 INJECTION INTRAVENOUS; SUBCUTANEOUS at 15:33

## 2019-02-07 RX ADMIN — CEFTRIAXONE 100 GRAM(S): 500 INJECTION, POWDER, FOR SOLUTION INTRAMUSCULAR; INTRAVENOUS at 10:26

## 2019-02-07 RX ADMIN — ISOSORBIDE MONONITRATE 120 MILLIGRAM(S): 60 TABLET, EXTENDED RELEASE ORAL at 15:24

## 2019-02-07 NOTE — PHYSICAL THERAPY INITIAL EVALUATION ADULT - ADDITIONAL COMMENTS
Pt is a poor historian. per care coordination note; pt lives alone in a house with no stairs, nephew and niece live downstairs and daughter is in and out. Unclear if pt has any assistive equipment.

## 2019-02-07 NOTE — PROGRESS NOTE ADULT - SUBJECTIVE AND OBJECTIVE BOX
Patient is a 89y old  Male who presents with a chief complaint of Acute encephalopathy, suspected UTI  he is seen in am , confused trying to get out of bed , not able to give any history denies pain     HPI:  89 year old male patient with PMHx T2DM on oral hypoglycemic, BPH resenting with chief complain of altered mental status. Patient is poor historian (currently AOx1-2) and isn't sure why he's being admitted. Hx obtained from chart and Daughter Marjorie via phone.    As per daughter, the patient lives alone and is typically independent, however the daughter has placed cameras in his house a few months ago to help monitor him.  Daughter reports pt has been presenting episodes of worsening confusion for the last week, characterized for waking up in the middle of the night thinking it was the morning or going to bed in morning ours thinking it was night time. Over the past few days, patient did not recognize where he was which was a change for him, so earlier today pt was taken by her daughter to an urgent care where hyperglycemia was noted and patient was referred to the ED.   Per daughter, pt has not reported chest pain, abdominal pain, dyspnea, diarrhea, vomiting, changes in frequency of urination, dysuria recently.     In ED, pt found to FS >530 with serum glucose of 647 without anion gap and with negative acetone. He received insulin 12units with rapid improvement in FS <100 on subsequent recheck. UA also mildly positive.  Daughter denies pt being on insulin and say she helps given him his meds. CT head negative for acute intracranial abnormality. (2019 04:40)      Allergies    No Known Allergies    Intolerances        Vital Signs Last 24 Hrs  T(C): 36.6 (2019 16:30), Max: 37.1 (2019 11:20)  T(F): 97.9 (2019 16:30), Max: 98.8 (2019 11:20)  HR: 74 (2019 16:30) (68 - 86)  BP: 162/75 (2019 16:30) (98/68 - 162/75)  BP(mean): --  RR: 18 (2019 16:30) (18 - 18)  SpO2: 98% (2019 11:20) (98% - 98%)    PHYSICAL EXAM:    GENERAL: NAD, cachectic   NECK: Supple, No JVD  NERVOUS SYSTEM:  confused moves all extremities , normal speech Alert & Oriented X3,  CHEST/LUNG: Clear to percussion bilaterally; No rales, rhonchi, wheezing, or rubs  HEART: Regular rate and rhythm; No murmurs, rubs, or gallops  ABDOMEN: Soft, Nontender, Nondistended; Bowel sounds present  EXTREMITIES:  2+ Peripheral Pulses, No clubbing, cyanosis, or edema  LYMPH: No lymphadenopathy noted  SKIN: No rashes or lesions      LABS:                        11.3   5.7   )-----------( 229      ( 2019 09:01 )             34.6     02-07    143  |  106  |  40.0<H>  ----------------------------<  179<H>  4.5   |  23.0  |  2.05<H>    Ca    8.9      2019 09:01  Mg     2.4     02-05    TPro  7.3  /  Alb  3.7  /  TBili  0.3<L>  /  DBili  x   /  AST  9   /  ALT  11  /  AlkPhos  126<H>  02-05      Urinalysis Basic - ( 2019 23:06 )    Color: Yellow / Appearance: Clear / S.005 / pH: x  Gluc: x / Ketone: Negative  / Bili: Negative / Urobili: Negative mg/dL   Blood: x / Protein: 30 mg/dL / Nitrite: Negative   Leuk Esterase: Moderate / RBC: 0-2 /HPF / WBC 6-10   Sq Epi: x / Non Sq Epi: Few / Bacteria: x        RADIOLOGY & ADDITIONAL TESTS:

## 2019-02-08 DIAGNOSIS — N39.0 URINARY TRACT INFECTION, SITE NOT SPECIFIED: ICD-10-CM

## 2019-02-08 LAB
GLUCOSE BLDC GLUCOMTR-MCNC: 124 MG/DL — HIGH (ref 70–99)
GLUCOSE BLDC GLUCOMTR-MCNC: 153 MG/DL — HIGH (ref 70–99)
GLUCOSE BLDC GLUCOMTR-MCNC: 208 MG/DL — HIGH (ref 70–99)
GLUCOSE BLDC GLUCOMTR-MCNC: 233 MG/DL — HIGH (ref 70–99)
GLUCOSE BLDC GLUCOMTR-MCNC: 289 MG/DL — HIGH (ref 70–99)

## 2019-02-08 PROCEDURE — 99232 SBSQ HOSP IP/OBS MODERATE 35: CPT

## 2019-02-08 RX ADMIN — Medication 2: at 12:58

## 2019-02-08 RX ADMIN — Medication 250 MILLIGRAM(S): at 06:04

## 2019-02-08 RX ADMIN — ISOSORBIDE MONONITRATE 120 MILLIGRAM(S): 60 TABLET, EXTENDED RELEASE ORAL at 12:59

## 2019-02-08 RX ADMIN — CEFTRIAXONE 100 GRAM(S): 500 INJECTION, POWDER, FOR SOLUTION INTRAMUSCULAR; INTRAVENOUS at 06:02

## 2019-02-08 RX ADMIN — Medication 250 MILLIGRAM(S): at 18:45

## 2019-02-08 RX ADMIN — HEPARIN SODIUM 5000 UNIT(S): 5000 INJECTION INTRAVENOUS; SUBCUTANEOUS at 21:24

## 2019-02-08 RX ADMIN — Medication 1: at 21:25

## 2019-02-08 RX ADMIN — TAMSULOSIN HYDROCHLORIDE 0.4 MILLIGRAM(S): 0.4 CAPSULE ORAL at 21:24

## 2019-02-08 RX ADMIN — Medication 1: at 08:40

## 2019-02-08 RX ADMIN — HEPARIN SODIUM 5000 UNIT(S): 5000 INJECTION INTRAVENOUS; SUBCUTANEOUS at 06:02

## 2019-02-08 NOTE — DIETITIAN INITIAL EVALUATION ADULT. - PERTINENT LABORATORY DATA
02-07 Na143 mmol/L Glu 179 mg/dL<H> K+ 4.5 mmol/L Cr  2.05 mg/dL<H> BUN 40.0 mg/dL<H> Phos n/a   Alb n/a   PAB n/a

## 2019-02-08 NOTE — PROGRESS NOTE ADULT - SUBJECTIVE AND OBJECTIVE BOX
Patient is a 89y old  Male who presents with a chief complaint of Acute encephalopathy, suspected UTI  he is seen in am , no confusion awake alert oriented , no overnight events reported     HPI:  89 year old male patient with PMHx T2DM on oral hypoglycemic, BPH resenting with chief complain of altered mental status. Patient is poor historian (currently AOx1-2) and isn't sure why he's being admitted. Hx obtained from chart and Daughter Marjorie via phone.    As per daughter, the patient lives alone and is typically independent, however the daughter has placed cameras in his house a few months ago to help monitor him.  Daughter reports pt has been presenting episodes of worsening confusion for the last week, characterized for waking up in the middle of the night thinking it was the morning or going to bed in morning ours thinking it was night time. Over the past few days, patient did not recognize where he was which was a change for him, so earlier today pt was taken by her daughter to an urgent care where hyperglycemia was noted and patient was referred to the ED.   Per daughter, pt has not reported chest pain, abdominal pain, dyspnea, diarrhea, vomiting, changes in frequency of urination, dysuria recently.     In ED, pt found to FS >530 with serum glucose of 647 without anion gap and with negative acetone. He received insulin 12units with rapid improvement in FS <100 on subsequent recheck. UA also mildly positive.  Daughter denies pt being on insulin and say she helps given him his meds. CT head negative for acute intracranial abnormality. (2019 04:40)      Allergies    No Known Allergies    Intolerances      Vital Signs Last 24 Hrs  T(C): 36.7 (2019 16:53), Max: 36.8 (2019 09:45)  T(F): 98.1 (2019 16:53), Max: 98.3 (2019 09:45)  HR: 72 (2019 16:53) (72 - 103)  BP: 117/58 (2019 16:53) (117/58 - 159/58)  BP(mean): --  RR: 18 (2019 16:53) (18 - 20)  SpO2: 96% (2019 09:45) (96% - 96%)  PHYSICAL EXAM:    GENERAL: NAD, cachectic   NECK: Supple, No JVD  CHEST/LUNG: Clear to auscultation  bilaterally; No rales, rhonchi, wheezing, or rubs  HEART: Regular rate and rhythm; No murmurs, rubs, or gallops  ABDOMEN: Soft, Nontender, Nondistended; Bowel sounds present  EXTREMITIES:  2+ Peripheral Pulses, No clubbing, cyanosis, or edema    CAPILLARY BLOOD GLUCOSE      POCT Blood Glucose.: 124 mg/dL (2019 17:37)  POCT Blood Glucose.: 233 mg/dL (2019 12:09)  POCT Blood Glucose.: 153 mg/dL (2019 08:25)  POCT Blood Glucose.: 208 mg/dL (2019 23:15)    LABS:                                 11.3   5.7   )-----------( 229      ( 2019 09:01 )             34.6                11.3   5.7   )-----------( 229      ( 2019 09:01 )             34.6     02-07    143  |  106  |  40.0<H>  ----------------------------<  179<H>  4.5   |  23.0  |  2.05<H>    Ca    8.9      2019 09:01  Mg     2.4     02-05    TPro  7.3  /  Alb  3.7  /  TBili  0.3<L>  /  DBili  x   /  AST  9   /  ALT  11  /  AlkPhos  126<H>  02-05      Urinalysis Basic - ( 2019 23:06 )    Color: Yellow / Appearance: Clear / S.005 / pH: x  Gluc: x / Ketone: Negative  / Bili: Negative / Urobili: Negative mg/dL   Blood: x / Protein: 30 mg/dL / Nitrite: Negative   Leuk Esterase: Moderate / RBC: 0-2 /HPF / WBC 6-10   Sq Epi: x / Non Sq Epi: Few / Bacteria: x        RADIOLOGY & ADDITIONAL TESTS: Intermediate Repair Preamble Text (Leave Blank If You Do Not Want): Undermining was performed with blunt dissection.

## 2019-02-08 NOTE — DIETITIAN INITIAL EVALUATION ADULT. - OTHER INFO
.9, Unable to interview pt due to AMS, confusion. Good po intake reported by nursing. Stage 2 coccyx noted. mild fat/muscle depletion noted in temples, clavicle

## 2019-02-08 NOTE — CHART NOTE - NSCHARTNOTEFT_GEN_A_CORE
Upon Nutritional Assessment by the Registered Dietitian your patient was determined to meet criteria / has evidence of the following diagnosis/diagnoses:          [x ]  Mild Protein Calorie Malnutrition  ACUTE        [ ]  Moderate Protein Calorie Malnutrition        [ ] Severe Protein Calorie Malnutrition        [ ] Unspecified Protein Calorie Malnutrition        [ ] Underweight / BMI <19        [ ] Morbid Obesity / BMI > 40      Findings as based on:  •  Comprehensive nutrition assessment and consultation  •  Calorie counts (nutrient intake analysis)  •  Food acceptance and intake status from observations by staff  •  Follow up  •  Patient education  •  Intervention secondary to interdisciplinary rounds  •   concerns      Treatment:    The following diet has been recommended: Rec Glucerna shake TID      PROVIDER Section:     By signing this assessment you are acknowledging and agree with the diagnosis/diagnoses assigned by the Registered Dietitian    Comments:

## 2019-02-09 ENCOUNTER — TRANSCRIPTION ENCOUNTER (OUTPATIENT)
Age: 84
End: 2019-02-09

## 2019-02-09 DIAGNOSIS — N18.3 CHRONIC KIDNEY DISEASE, STAGE 3 (MODERATE): ICD-10-CM

## 2019-02-09 DIAGNOSIS — E11.65 TYPE 2 DIABETES MELLITUS WITH HYPERGLYCEMIA: ICD-10-CM

## 2019-02-09 DIAGNOSIS — N40.0 BENIGN PROSTATIC HYPERPLASIA WITHOUT LOWER URINARY TRACT SYMPTOMS: ICD-10-CM

## 2019-02-09 LAB
-  AMPICILLIN: SIGNIFICANT CHANGE UP
-  LEVOFLOXACIN: SIGNIFICANT CHANGE UP
-  NITROFURANTOIN: SIGNIFICANT CHANGE UP
-  TETRACYCLINE: SIGNIFICANT CHANGE UP
-  VANCOMYCIN: SIGNIFICANT CHANGE UP
CULTURE RESULTS: SIGNIFICANT CHANGE UP
GLUCOSE BLDC GLUCOMTR-MCNC: 125 MG/DL — HIGH (ref 70–99)
GLUCOSE BLDC GLUCOMTR-MCNC: 170 MG/DL — HIGH (ref 70–99)
GLUCOSE BLDC GLUCOMTR-MCNC: 200 MG/DL — HIGH (ref 70–99)
GLUCOSE BLDC GLUCOMTR-MCNC: 295 MG/DL — HIGH (ref 70–99)
METHOD TYPE: SIGNIFICANT CHANGE UP
ORGANISM # SPEC MICROSCOPIC CNT: SIGNIFICANT CHANGE UP
ORGANISM # SPEC MICROSCOPIC CNT: SIGNIFICANT CHANGE UP
SPECIMEN SOURCE: SIGNIFICANT CHANGE UP

## 2019-02-09 PROCEDURE — 99232 SBSQ HOSP IP/OBS MODERATE 35: CPT

## 2019-02-09 RX ORDER — SODIUM CHLORIDE 9 MG/ML
1000 INJECTION, SOLUTION INTRAVENOUS
Qty: 0 | Refills: 0 | Status: COMPLETED | OUTPATIENT
Start: 2019-02-09 | End: 2019-02-09

## 2019-02-09 RX ADMIN — Medication 250 MILLIGRAM(S): at 16:54

## 2019-02-09 RX ADMIN — SODIUM CHLORIDE 85 MILLILITER(S): 9 INJECTION, SOLUTION INTRAVENOUS at 17:06

## 2019-02-09 RX ADMIN — CEFTRIAXONE 100 GRAM(S): 500 INJECTION, POWDER, FOR SOLUTION INTRAMUSCULAR; INTRAVENOUS at 06:00

## 2019-02-09 RX ADMIN — Medication 1: at 08:37

## 2019-02-09 RX ADMIN — Medication 250 MILLIGRAM(S): at 05:17

## 2019-02-09 RX ADMIN — HEPARIN SODIUM 5000 UNIT(S): 5000 INJECTION INTRAVENOUS; SUBCUTANEOUS at 05:17

## 2019-02-09 RX ADMIN — TAMSULOSIN HYDROCHLORIDE 0.4 MILLIGRAM(S): 0.4 CAPSULE ORAL at 21:05

## 2019-02-09 RX ADMIN — Medication 1 TABLET(S): at 16:56

## 2019-02-09 RX ADMIN — HEPARIN SODIUM 5000 UNIT(S): 5000 INJECTION INTRAVENOUS; SUBCUTANEOUS at 15:40

## 2019-02-09 RX ADMIN — Medication 3: at 13:10

## 2019-02-09 RX ADMIN — HEPARIN SODIUM 5000 UNIT(S): 5000 INJECTION INTRAVENOUS; SUBCUTANEOUS at 21:06

## 2019-02-09 NOTE — PROGRESS NOTE ADULT - PROBLEM SELECTOR PLAN 1
cx pending cont empirical iv abx
likely metabolic due to infection , high BG   monitor closely safety , fall precautions
cx result reviewed, will stop rocephin   clinically stable improving  not toxic , start  augmentin po   monitor closely

## 2019-02-09 NOTE — PROGRESS NOTE ADULT - SUBJECTIVE AND OBJECTIVE BOX
Patient is a 89y old  Male who presents with a chief complaint of Acute encephalopathy, suspected UTI  seen in am , chart reviewed since last seen   he is awake , no complaints today   no overnight events reported       HPI:  89 year old male patient with PMHx T2DM on oral hypoglycemic, BPH resenting with chief complain of altered mental status. Patient is poor historian (currently AOx1-2) and isn't sure why he's being admitted. Hx obtained from chart and Daughter Marjorie via phone.  As per daughter, the patient lives alone and is typically independent, however the daughter has placed cameras in his house a few months ago to help monitor him.  Daughter reports pt has been presenting episodes of worsening confusion for the last week, characterized for waking up in the middle of the night thinking it was the morning or going to bed in morning ours thinking it was night time. Over the past few days, patient did not recognize where he was which was a change for him, so earlier today pt was taken by her daughter to an urgent care where hyperglycemia was noted and patient was referred to the ED.   Per daughter, pt has not reported chest pain, abdominal pain, dyspnea, diarrhea, vomiting, changes in frequency of urination, dysuria recently.   In ED, pt found to FS >530 with serum glucose of 647 without anion gap and with negative acetone. He received insulin 12units with rapid improvement in FS <100 on subsequent recheck. UA also mildly positive.  Daughter denies pt being on insulin and say she helps given him his meds. CT head negative for acute intracranial abnormality. (2019 04:40)          Allergies    No Known Allergies    Intolerances      Vital Signs Last 24 Hrs  T(C): 36.9 (2019 15:16), Max: 36.9 (2019 11:12)  T(F): 98.4 (2019 15:16), Max: 98.4 (2019 11:12)  HR: 73 (2019 15:16) (72 - 80)  BP: 112/47 (2019 15:16) (112/47 - 140/64)  BP(mean): --  RR: 18 (2019 15:16) (18 - 19)  SpO2: 96% (2019 11:12) (96% - 98%)    GENERAL: NAD, cachectic comfortable   NECK: Supple, No JVD  CHEST/LUNG: Clear to auscultation  bilaterally; No rales, rhonchi, wheezing, or rubs  HEART: Regular rate and rhythm; No murmurs, rubs, or gallops  ABDOMEN: Soft, Nontender, Nondistended; Bowel sounds present  EXTREMITIES:  2+ Peripheral Pulses, No clubbing, cyanosis, or edema    CAPILLARY BLOOD GLUCOSE      POCT Blood Glucose.: 295 mg/dL (2019 13:09)  POCT Blood Glucose.: 170 mg/dL (2019 08:36)  POCT Blood Glucose.: 289 mg/dL (2019 21:23)  POCT Blood Glucose.: 124 mg/dL (2019 17:37)    LABS:                                 11.3   5.7   )-----------( 229      ( 2019 09:01 )             34.6                11.3   5.7   )-----------( 229      ( 2019 09:01 )             34.6     02    143  |  106  |  40.0<H>  ----------------------------<  179<H>  4.5   |  23.0  |  2.05<H>    Ca    8.9      2019 09:01  Mg     2.4     -    TPro  7.3  /  Alb  3.7  /  TBili  0.3<L>  /  DBili  x   /  AST  9   /  ALT  11  /  AlkPhos  126<H>        Urinalysis Basic - ( 2019 23:06 )    Color: Yellow / Appearance: Clear / S.005 / pH: x  Gluc: x / Ketone: Negative  / Bili: Negative / Urobili: Negative mg/dL   Blood: x / Protein: 30 mg/dL / Nitrite: Negative   Leuk Esterase: Moderate / RBC: 0-2 /HPF / WBC 6-10   Sq Epi: x / Non Sq Epi: Few / Bacteria: x  Culture - Urine (19 @ 23:07)    -  Ampicillin: S <=2 Predicts results to ampicillin/sulbactam, amoxacillin-clavulanate and  piperacillin-tazobactam.    -  Levofloxacin: S <=1    -  Nitrofurantoin: S <=32 Should not be used to treat pyelonephritis.    -  Tetra/Doxy: S <=4    -  Vancomycin: S 2    Specimen Source: .Urine    Culture Results:   >100,000 CFU/ml Enterococcus faecalis  10,000 - 49,000 CFU/mL Coag Negative Staphylococcus    Organism Identification: Enterococcus faecalis    Organism: Enterococcus faecalis    Method Type: ANDREINA          RADIOLOGY & ADDITIONAL TESTS:

## 2019-02-09 NOTE — PROGRESS NOTE ADULT - PROBLEM SELECTOR PROBLEM 1
Altered mental status
Urinary tract infection without hematuria, site unspecified
Urinary tract infection without hematuria, site unspecified

## 2019-02-09 NOTE — PROGRESS NOTE ADULT - PROBLEM SELECTOR PROBLEM 2
Altered mental status
R/O Urinary tract infection without hematuria, site unspecified
Altered mental status

## 2019-02-09 NOTE — PROGRESS NOTE ADULT - PROBLEM SELECTOR PLAN 3
sliding scale insulin coverage
sliding scale insulin coverage, BS controlled   stable
improved , BG stable   cont sliding scale insulin coverage

## 2019-02-09 NOTE — PROGRESS NOTE ADULT - PROBLEM SELECTOR PROBLEM 3
Type 2 diabetes mellitus with complication, unspecified whether long term insulin use
Type 2 diabetes mellitus with complication, unspecified whether long term insulin use
Type 2 diabetes mellitus with hyperglycemia, unspecified whether long term insulin use

## 2019-02-09 NOTE — PROGRESS NOTE ADULT - PROBLEM SELECTOR PLAN 2
cx pending cont empirical iv abx
likely metabolic due to infection , hyperglycemia much improvved   monitor closely safety , fall precautions
likely metabolic due to hyperglycemia and infection   much improved

## 2019-02-10 LAB
ANION GAP SERPL CALC-SCNC: 13 MMOL/L — SIGNIFICANT CHANGE UP (ref 5–17)
BUN SERPL-MCNC: 48 MG/DL — HIGH (ref 8–20)
CALCIUM SERPL-MCNC: 8.8 MG/DL — SIGNIFICANT CHANGE UP (ref 8.6–10.2)
CHLORIDE SERPL-SCNC: 108 MMOL/L — HIGH (ref 98–107)
CO2 SERPL-SCNC: 21 MMOL/L — LOW (ref 22–29)
CREAT SERPL-MCNC: 2.11 MG/DL — HIGH (ref 0.5–1.3)
GLUCOSE BLDC GLUCOMTR-MCNC: 158 MG/DL — HIGH (ref 70–99)
GLUCOSE BLDC GLUCOMTR-MCNC: 225 MG/DL — HIGH (ref 70–99)
GLUCOSE BLDC GLUCOMTR-MCNC: 234 MG/DL — HIGH (ref 70–99)
GLUCOSE BLDC GLUCOMTR-MCNC: 340 MG/DL — HIGH (ref 70–99)
GLUCOSE SERPL-MCNC: 170 MG/DL — HIGH (ref 70–115)
HCT VFR BLD CALC: 34.7 % — LOW (ref 42–52)
HGB BLD-MCNC: 11.4 G/DL — LOW (ref 14–18)
MAGNESIUM SERPL-MCNC: 2 MG/DL — SIGNIFICANT CHANGE UP (ref 1.6–2.6)
MCHC RBC-ENTMCNC: 30.2 PG — SIGNIFICANT CHANGE UP (ref 27–31)
MCHC RBC-ENTMCNC: 32.9 G/DL — SIGNIFICANT CHANGE UP (ref 32–36)
MCV RBC AUTO: 91.8 FL — SIGNIFICANT CHANGE UP (ref 80–94)
PHOSPHATE SERPL-MCNC: 3.1 MG/DL — SIGNIFICANT CHANGE UP (ref 2.4–4.7)
PLATELET # BLD AUTO: 207 K/UL — SIGNIFICANT CHANGE UP (ref 150–400)
POTASSIUM SERPL-MCNC: 4.1 MMOL/L — SIGNIFICANT CHANGE UP (ref 3.5–5.3)
POTASSIUM SERPL-SCNC: 4.1 MMOL/L — SIGNIFICANT CHANGE UP (ref 3.5–5.3)
RBC # BLD: 3.78 M/UL — LOW (ref 4.6–6.2)
RBC # FLD: 12.4 % — SIGNIFICANT CHANGE UP (ref 11–15.6)
SODIUM SERPL-SCNC: 142 MMOL/L — SIGNIFICANT CHANGE UP (ref 135–145)
WBC # BLD: 4.9 K/UL — SIGNIFICANT CHANGE UP (ref 4.8–10.8)
WBC # FLD AUTO: 4.9 K/UL — SIGNIFICANT CHANGE UP (ref 4.8–10.8)

## 2019-02-10 PROCEDURE — 99232 SBSQ HOSP IP/OBS MODERATE 35: CPT

## 2019-02-10 RX ORDER — AMLODIPINE BESYLATE 2.5 MG/1
10 TABLET ORAL DAILY
Qty: 0 | Refills: 0 | Status: DISCONTINUED | OUTPATIENT
Start: 2019-02-10 | End: 2019-02-12

## 2019-02-10 RX ORDER — ONDANSETRON 8 MG/1
4 TABLET, FILM COATED ORAL EVERY 6 HOURS
Qty: 0 | Refills: 0 | Status: DISCONTINUED | OUTPATIENT
Start: 2019-02-10 | End: 2019-02-12

## 2019-02-10 RX ORDER — HYDRALAZINE HCL 50 MG
10 TABLET ORAL EVERY 6 HOURS
Qty: 0 | Refills: 0 | Status: DISCONTINUED | OUTPATIENT
Start: 2019-02-10 | End: 2019-02-12

## 2019-02-10 RX ORDER — ACETAMINOPHEN 500 MG
650 TABLET ORAL EVERY 6 HOURS
Qty: 0 | Refills: 0 | Status: DISCONTINUED | OUTPATIENT
Start: 2019-02-10 | End: 2019-02-12

## 2019-02-10 RX ADMIN — AMLODIPINE BESYLATE 10 MILLIGRAM(S): 2.5 TABLET ORAL at 08:49

## 2019-02-10 RX ADMIN — Medication 250 MILLIGRAM(S): at 05:07

## 2019-02-10 RX ADMIN — HEPARIN SODIUM 5000 UNIT(S): 5000 INJECTION INTRAVENOUS; SUBCUTANEOUS at 21:52

## 2019-02-10 RX ADMIN — HEPARIN SODIUM 5000 UNIT(S): 5000 INJECTION INTRAVENOUS; SUBCUTANEOUS at 18:14

## 2019-02-10 RX ADMIN — Medication 250 MILLIGRAM(S): at 18:13

## 2019-02-10 RX ADMIN — Medication 1: at 08:38

## 2019-02-10 RX ADMIN — HEPARIN SODIUM 5000 UNIT(S): 5000 INJECTION INTRAVENOUS; SUBCUTANEOUS at 05:07

## 2019-02-10 RX ADMIN — Medication 4: at 18:15

## 2019-02-10 RX ADMIN — Medication 1 TABLET(S): at 18:14

## 2019-02-10 RX ADMIN — TAMSULOSIN HYDROCHLORIDE 0.4 MILLIGRAM(S): 0.4 CAPSULE ORAL at 21:52

## 2019-02-10 RX ADMIN — ISOSORBIDE MONONITRATE 120 MILLIGRAM(S): 60 TABLET, EXTENDED RELEASE ORAL at 12:18

## 2019-02-10 RX ADMIN — Medication 2: at 12:18

## 2019-02-10 RX ADMIN — Medication 1 TABLET(S): at 05:07

## 2019-02-10 NOTE — PROGRESS NOTE ADULT - ASSESSMENT
89 yo male with Diabetes Mellitus , BPH admitted for altered mental status , hyperglycemia and UTI      Problem/Plan - 1:  ·  Problem: enterococcus Urinary tract infection without hematuria, site unspecified  --> c.,w po augmentin      Problem/Plan - 2:  ·  Problem: metabolic encephalopahty in setting of mild dementia.  Plan: likely metabolic due to hyperglycemia and infection   almost at baseline as per daughter     Problem/Plan - 3:  ·  Problem: Type 2 diabetes mellitus with hyperglycemia, unspecified whether long term insulin use.  Plan: improved , BG stable   cont sliding scale insulin coverage.      Problem/Plan - 4:  ·  Problem: CRF (chronic renal failure), stage 3 (moderate).    --> creatinine at baseline     Problem/Plan - 5:  ·  Problem: Enlarged prostate.  Plan: on flomax , voiding.     pt consult  daughter requesting margie

## 2019-02-10 NOTE — PROGRESS NOTE ADULT - SUBJECTIVE AND OBJECTIVE BOX
STAR PRICE    [id number]    89y      Male    INTERVAL HPI/OVERNIGHT EVENTS:    patient being seen for uti, ckd and dm2. patient seen at bedside with daughter and in nad. patient is pleasantly confused    last 24 hours patient is afebrile.     REVIEW OF SYSTEMS:    CONSTITUTIONAL: No fever, weight loss, or fatigue  RESPIRATORY: No cough, wheezing, hemoptysis; No shortness of breath  CARDIOVASCULAR: No chest pain, palpitations  GASTROINTESTINAL: No abdominal or epigastric pain. No nausea, vomiting  NEUROLOGICAL: No headaches, memory loss, loss of strength.  MISCELLANEOUS:      Vital Signs Last 24 Hrs  T(C): 36.5 (10 Feb 2019 14:29), Max: 37.1 (09 Feb 2019 19:39)  T(F): 97.7 (10 Feb 2019 14:29), Max: 98.8 (09 Feb 2019 19:39)  HR: 73 (10 Feb 2019 14:29) (67 - 82)  BP: 119/64 (10 Feb 2019 14:29) (112/47 - 158/56)  BP(mean): --  RR: 18 (10 Feb 2019 14:29) (18 - 18)  SpO2: 97% (10 Feb 2019 14:29) (95% - 97%)    PHYSICAL EXAM:    GENERAL: NAD, cachectic comfortable   NECK: Supple, No JVD  CHEST/LUNG: Clear to auscultation  bilaterally; No rales,   HEART: Regular rate and rhythm; No murmurs, rubs, or gallops  ABDOMEN: Soft, Nontender, Nondistended; Bowel sounds present  EXTREMITIES:  2+ Peripheral Pulses, No clubbing, cyanosis, or edema  NEURO: aaox 2    LABS:                        11.4   4.9   )-----------( 207      ( 10 Feb 2019 08:15 )             34.7     02-10    142  |  108<H>  |  48.0<H>  ----------------------------<  170<H>  4.1   |  21.0<L>  |  2.11<H>    Ca    8.8      10 Feb 2019 08:15  Phos  3.1     02-10  Mg     2.0     02-10          MEDICATIONS  (STANDING):  amLODIPine   Tablet 10 milliGRAM(s) Oral daily  amoxicillin  875 milliGRAM(s)/clavulanate 1 Tablet(s) Oral two times a day  dextrose 5%. 1000 milliLiter(s) (50 mL/Hr) IV Continuous <Continuous>  dextrose 50% Injectable 12.5 Gram(s) IV Push once  dextrose 50% Injectable 25 Gram(s) IV Push once  dextrose 50% Injectable 25 Gram(s) IV Push once  heparin  Injectable 5000 Unit(s) SubCutaneous every 8 hours  insulin lispro (HumaLOG) corrective regimen sliding scale   SubCutaneous three times a day before meals  insulin lispro (HumaLOG) corrective regimen sliding scale   SubCutaneous at bedtime  isosorbide   mononitrate ER Tablet (IMDUR) 120 milliGRAM(s) Oral daily  saccharomyces boulardii 250 milliGRAM(s) Oral two times a day  tamsulosin 0.4 milliGRAM(s) Oral at bedtime    MEDICATIONS  (PRN):  acetaminophen   Tablet .. 650 milliGRAM(s) Oral every 6 hours PRN Temp greater or equal to 38C (100.4F), Mild Pain (1 - 3)  dextrose 40% Gel 15 Gram(s) Oral once PRN Blood Glucose LESS THAN 70 milliGRAM(s)/deciliter  glucagon  Injectable 1 milliGRAM(s) IntraMuscular once PRN Glucose LESS THAN 70 milligrams/deciliter  hydrALAZINE Injectable 10 milliGRAM(s) IV Push every 6 hours PRN for sbp above 160 mmhg  ondansetron Injectable 4 milliGRAM(s) IV Push every 6 hours PRN Nausea and/or Vomiting      RADIOLOGY & ADDITIONAL TESTS:

## 2019-02-11 ENCOUNTER — TRANSCRIPTION ENCOUNTER (OUTPATIENT)
Age: 84
End: 2019-02-11

## 2019-02-11 LAB
GLUCOSE BLDC GLUCOMTR-MCNC: 203 MG/DL — HIGH (ref 70–99)
GLUCOSE BLDC GLUCOMTR-MCNC: 241 MG/DL — HIGH (ref 70–99)
GLUCOSE BLDC GLUCOMTR-MCNC: 262 MG/DL — HIGH (ref 70–99)

## 2019-02-11 PROCEDURE — 99232 SBSQ HOSP IP/OBS MODERATE 35: CPT

## 2019-02-11 RX ORDER — ISOSORBIDE MONONITRATE 60 MG/1
1 TABLET, EXTENDED RELEASE ORAL
Qty: 0 | Refills: 0 | COMMUNITY

## 2019-02-11 RX ORDER — TAMSULOSIN HYDROCHLORIDE 0.4 MG/1
1 CAPSULE ORAL
Qty: 0 | Refills: 0 | COMMUNITY
Start: 2019-02-11

## 2019-02-11 RX ORDER — SITAGLIPTIN 50 MG/1
1 TABLET, FILM COATED ORAL
Qty: 30 | Refills: 0 | OUTPATIENT
Start: 2019-02-11 | End: 2019-03-12

## 2019-02-11 RX ORDER — ISOSORBIDE MONONITRATE 60 MG/1
1 TABLET, EXTENDED RELEASE ORAL
Qty: 0 | Refills: 0 | COMMUNITY
Start: 2019-02-11

## 2019-02-11 RX ORDER — AMLODIPINE BESYLATE 2.5 MG/1
1 TABLET ORAL
Qty: 0 | Refills: 0 | COMMUNITY
Start: 2019-02-11

## 2019-02-11 RX ADMIN — HEPARIN SODIUM 5000 UNIT(S): 5000 INJECTION INTRAVENOUS; SUBCUTANEOUS at 12:37

## 2019-02-11 RX ADMIN — Medication 1 TABLET(S): at 05:21

## 2019-02-11 RX ADMIN — Medication 2: at 12:22

## 2019-02-11 RX ADMIN — Medication 250 MILLIGRAM(S): at 05:22

## 2019-02-11 RX ADMIN — Medication 1: at 21:45

## 2019-02-11 RX ADMIN — TAMSULOSIN HYDROCHLORIDE 0.4 MILLIGRAM(S): 0.4 CAPSULE ORAL at 21:45

## 2019-02-11 RX ADMIN — AMLODIPINE BESYLATE 10 MILLIGRAM(S): 2.5 TABLET ORAL at 05:21

## 2019-02-11 RX ADMIN — HEPARIN SODIUM 5000 UNIT(S): 5000 INJECTION INTRAVENOUS; SUBCUTANEOUS at 05:22

## 2019-02-11 RX ADMIN — Medication 2: at 09:01

## 2019-02-11 RX ADMIN — HEPARIN SODIUM 5000 UNIT(S): 5000 INJECTION INTRAVENOUS; SUBCUTANEOUS at 21:45

## 2019-02-11 RX ADMIN — ISOSORBIDE MONONITRATE 120 MILLIGRAM(S): 60 TABLET, EXTENDED RELEASE ORAL at 12:37

## 2019-02-11 NOTE — DISCHARGE NOTE ADULT - PLAN OF CARE
secondarty to uti treated for uti  follow up with primary plana s per #1 cr at baseline of 2.0 daughter would like to try oral meds will add januvia renall dosed showing signs of erlay dementia daughter would like to try oral meds, refusing insulin will add januvia renally dosed

## 2019-02-11 NOTE — DISCHARGE NOTE ADULT - CARE PROVIDER_API CALL
Scott Fernando (MD)  Urology  [address] [place] [address]  Phone: [phone]  Fax: [phone]  Follow Up Time:     Basilio Payne)  Cardiovascular Disease  [address] [place] [address]  Phone: [phone]  Fax: [phone]  Follow Up Time:     Lobito Tello)  Family Medicine  [address] [place] [address]  Phone: [phone]  Fax: [phone]  Follow Up Time:     Zee Lopez ()  Family Medicine  [address] [place] [id number]  Phone: [phone]  Fax: [phone]  Follow Up Time:

## 2019-02-11 NOTE — DISCHARGE NOTE ADULT - PATIENT PORTAL LINK FT
You can access the [facility] Portal, offered by [facility], by registering with the following website: [email or web address]

## 2019-02-11 NOTE — DISCHARGE NOTE ADULT - CARE PLAN
Principal Discharge DX:	Toxic metabolic encephalopathy  Goal:	secondarty to uti  Assessment and plan of treatment:	treated for uti  follow up with primary  Secondary Diagnosis:	UTI (urinary tract infection)  Goal:	plana s per #1  Secondary Diagnosis:	CRF (chronic renal failure), stage 3 (moderate)  Goal:	cr at baseline of 2.0  Secondary Diagnosis:	Diabetes  Goal:	daughter would like to try oral meds  Assessment and plan of treatment:	will add januvia renall dosed  Secondary Diagnosis:	Dementia  Goal:	showing signs of erlay dementia Principal Discharge DX:	Toxic metabolic encephalopathy  Goal:	secondarty to uti  Assessment and plan of treatment:	treated for uti  follow up with primary  Secondary Diagnosis:	UTI (urinary tract infection)  Goal:	plana s per #1  Secondary Diagnosis:	CRF (chronic renal failure), stage 3 (moderate)  Goal:	cr at baseline of 2.0  Secondary Diagnosis:	Diabetes  Goal:	daughter would like to try oral meds, refusing insulin  Assessment and plan of treatment:	will add januvia renally dosed  Secondary Diagnosis:	Dementia  Goal:	showing signs of erlay dementia

## 2019-02-11 NOTE — CHART NOTE - NSCHARTNOTEFT_GEN_A_CORE
Source: Patient [ ]  Family [ ]   other [ x]    Current Diet: consistent CHO, renal    PO intake:  < 50% [ ]   50-75%  [ ]   %  [x ]  other :    Source for PO intake [ ] Patient [ ] family [x ] chart [ ] staff [ ] other    Enteral /Parenteral Nutrition:     Current Weight: no new weight noted    % Weight Change     Pertinent Medications: MEDICATIONS  (STANDING):  amLODIPine   Tablet 10 milliGRAM(s) Oral daily  amoxicillin  875 milliGRAM(s)/clavulanate 1 Tablet(s) Oral two times a day  dextrose 5%. 1000 milliLiter(s) (50 mL/Hr) IV Continuous <Continuous>  dextrose 50% Injectable 12.5 Gram(s) IV Push once  dextrose 50% Injectable 25 Gram(s) IV Push once  dextrose 50% Injectable 25 Gram(s) IV Push once  heparin  Injectable 5000 Unit(s) SubCutaneous every 8 hours  insulin lispro (HumaLOG) corrective regimen sliding scale   SubCutaneous three times a day before meals  insulin lispro (HumaLOG) corrective regimen sliding scale   SubCutaneous at bedtime  isosorbide   mononitrate ER Tablet (IMDUR) 120 milliGRAM(s) Oral daily  saccharomyces boulardii 250 milliGRAM(s) Oral two times a day  tamsulosin 0.4 milliGRAM(s) Oral at bedtime    MEDICATIONS  (PRN):  acetaminophen   Tablet .. 650 milliGRAM(s) Oral every 6 hours PRN Temp greater or equal to 38C (100.4F), Mild Pain (1 - 3)  dextrose 40% Gel 15 Gram(s) Oral once PRN Blood Glucose LESS THAN 70 milliGRAM(s)/deciliter  glucagon  Injectable 1 milliGRAM(s) IntraMuscular once PRN Glucose LESS THAN 70 milligrams/deciliter  hydrALAZINE Injectable 10 milliGRAM(s) IV Push every 6 hours PRN for sbp above 160 mmhg  ondansetron Injectable 4 milliGRAM(s) IV Push every 6 hours PRN Nausea and/or Vomiting    Pertinent Labs: CBC Full  -  ( 10 Feb 2019 08:15 )  WBC Count : 4.9 K/uL  Hemoglobin : 11.4 g/dL  Hematocrit : 34.7 %  Platelet Count - Automated : 207 K/uL  Mean Cell Volume : 91.8 fl  Mean Cell Hemoglobin : 30.2 pg  Mean Cell Hemoglobin Concentration : 32.9 g/dL  Auto Neutrophil # : x  Auto Lymphocyte # : x  Auto Monocyte # : x  Auto Eosinophil # : x  Auto Basophil # : x  Auto Neutrophil % : x  Auto Lymphocyte % : x  Auto Monocyte % : x  Auto Eosinophil % : x  Auto Basophil % : x    02-10 Na142 mmol/L Glu 170 mg/dL<H> K+ 4.1 mmol/L Cr  2.11 mg/dL<H> BUN 48.0 mg/dL<H> Phos 3.1 mg/dL Alb n/a   PAB n/a             Skin: stage coccyx    Nutrition focused physical exam conducted - found signs of malnutrition [ ]absent [ x]present    Subcutaneous fat loss: [ ] Orbital fat pads region, [ ]Buccal fat region, [ ]Triceps region,  [ ]Ribs region    Muscle wasting: [ x]Temples region, [x ]Clavicle region, [ ]Shoulder region, [ ]Scapula region, [ ]Interosseous region,  [ ]thigh region, [ ]Calf region    Estimated Needs:   [ ] no change since previous assessment  [ ] recalculated:     Current Nutrition Diagnosis: Malnutrition; mild acute related to inadequate energy intake in setting of encephalopathy, UTI, AMS as evidenced by stage 2 coccyx, poor po intake noted upon admission, mild fat/muscle depletion. Pt now eating well.       Recommendations: Continue to monitor. Rec Glucerna shake BID    Monitoring and Evaluation:   [x ] PO intake [x ] Tolerance to diet prescription [X] Weights  [X] Follow up per protocol [X] Labs:

## 2019-02-11 NOTE — DISCHARGE NOTE ADULT - CARE PROVIDERS DIRECT ADDRESSES
,DirectAddress_Unknown,[email or web address],DirectAddress_Unknown,DirectAddress_Unknown

## 2019-02-11 NOTE — PROGRESS NOTE ADULT - SUBJECTIVE AND OBJECTIVE BOX
STAR PRICE    [id number]    89y      Male    INTERVAL HPI/OVERNIGHT EVENTS:    patient being seen for uti, ckd and dm2. patient awaiting margie. patient deneis any complaints    REVIEW OF SYSTEMS:    CONSTITUTIONAL: No fever, weight loss, or fatigue  RESPIRATORY: No cough, wheezing, hemoptysis; No shortness of breath  CARDIOVASCULAR: No chest pain, palpitations  GASTROINTESTINAL: No abdominal or epigastric pain. No nausea, vomiting  NEUROLOGICAL: No headaches, memory loss, loss of strength.  MISCELLANEOUS:      Vital Signs Last 24 Hrs  T(C): 36.8 (12 Feb 2019 04:54), Max: 36.8 (11 Feb 2019 10:14)  T(F): 98.3 (12 Feb 2019 04:54), Max: 98.3 (12 Feb 2019 04:54)  HR: 80 (12 Feb 2019 04:54) (72 - 87)  BP: 158/73 (12 Feb 2019 04:54) (118/66 - 158/73)  BP(mean): --  RR: 18 (12 Feb 2019 04:54) (18 - 20)  SpO2: 98% (12 Feb 2019 04:54) (96% - 98%)    PHYSICAL EXAM:    GENERAL: NAD,    NECK: Supple, No JVD  CHEST/LUNG: Clear to auscultation  bilaterally; No rales,   HEART: Regular rate and rhythm; No murmurs, rubs, or gallops  ABDOMEN: Soft, Nontender, Nondistended; Bowel sounds present  EXTREMITIES:  2+ Peripheral Pulses, No edema  NEURO: aaox 2        LABS:          MEDICATIONS  (STANDING):  amLODIPine   Tablet 10 milliGRAM(s) Oral daily  amoxicillin  875 milliGRAM(s)/clavulanate 1 Tablet(s) Oral two times a day  dextrose 5%. 1000 milliLiter(s) (50 mL/Hr) IV Continuous <Continuous>  dextrose 50% Injectable 12.5 Gram(s) IV Push once  dextrose 50% Injectable 25 Gram(s) IV Push once  dextrose 50% Injectable 25 Gram(s) IV Push once  heparin  Injectable 5000 Unit(s) SubCutaneous every 8 hours  insulin lispro (HumaLOG) corrective regimen sliding scale   SubCutaneous three times a day before meals  insulin lispro (HumaLOG) corrective regimen sliding scale   SubCutaneous at bedtime  isosorbide   mononitrate ER Tablet (IMDUR) 120 milliGRAM(s) Oral daily  saccharomyces boulardii 250 milliGRAM(s) Oral two times a day  tamsulosin 0.4 milliGRAM(s) Oral at bedtime    MEDICATIONS  (PRN):  acetaminophen   Tablet .. 650 milliGRAM(s) Oral every 6 hours PRN Temp greater or equal to 38C (100.4F), Mild Pain (1 - 3)  dextrose 40% Gel 15 Gram(s) Oral once PRN Blood Glucose LESS THAN 70 milliGRAM(s)/deciliter  glucagon  Injectable 1 milliGRAM(s) IntraMuscular once PRN Glucose LESS THAN 70 milligrams/deciliter  hydrALAZINE Injectable 10 milliGRAM(s) IV Push every 6 hours PRN for sbp above 160 mmhg  ondansetron Injectable 4 milliGRAM(s) IV Push every 6 hours PRN Nausea and/or Vomiting      RADIOLOGY & ADDITIONAL TESTS:

## 2019-02-11 NOTE — DISCHARGE NOTE ADULT - NSTOBACCOWEBSITE_GEN_A_NCS
IPF - acute exacerbation  Etiology unclear: possible chronic hypersensitivity pneumonitis  No evidence PNA  Transient increase dyspnea assoc with IV NS - improved SOB today possible due to fluid DC vs steroid response  Gluscose intolerance on solumedrol    REC    Observe off antibiotics  Continue Solumedrol 30 q 12  Continue imuran - no objection to dose reduction or hold per heme  TTE for PA pressure and LV/RV function  Lasix 20 mg orally once ordered [place] website --- [email or web address] Website --- [email or web address]

## 2019-02-11 NOTE — DISCHARGE NOTE ADULT - PROVIDER TOKENS
PROVIDER:[TOKEN:[[id number]]],PROVIDER:[TOKEN:[[id number]]],PROVIDER:[TOKEN:[[id number]]],PROVIDER:[TOKEN:[[id number]]]

## 2019-02-11 NOTE — DISCHARGE NOTE ADULT - MEDICATION SUMMARY - MEDICATIONS TO TAKE
I will START or STAY ON the medications listed below when I get home from the hospital:    tamsulosin 0.4 mg oral capsule  -- 1 cap(s) by mouth once a day (at bedtime)  -- Indication: For bph    isosorbide mononitrate 120 mg oral tablet, extended release  -- 1 tab(s) by mouth once a day  -- Indication: For Htn    nateglinide 60 mg oral tablet  -- 1 tab(s) by mouth 3 times a day (before meals)  -- Indication: For dm2    Januvia 25 mg oral tablet  -- 1 tab(s) by mouth once a day   -- Do not drink alcoholic beverages when taking this medication.    -- Indication: For dm2    amLODIPine 10 mg oral tablet  -- 1 tab(s) by mouth once a day  -- Indication: For Htn    Augmentin 500 mg-125 mg oral tablet  -- 1 tab(s) by mouth every 12 hours   -- Finish all this medication unless otherwise directed by prescriber.  Take with food or milk.    -- Indication: For Uti I will START or STAY ON the medications listed below when I get home from the hospital:    tamsulosin 0.4 mg oral capsule  -- 1 cap(s) by mouth once a day (at bedtime)  -- Indication: For bph    isosorbide mononitrate 120 mg oral tablet, extended release  -- 1 tab(s) by mouth once a day  -- Indication: For Htn    nateglinide 60 mg oral tablet  -- 1 tab(s) by mouth 3 times a day (before meals)  -- Indication: For dm2    Januvia 25 mg oral tablet  -- 1 tab(s) by mouth once a day   -- Do not drink alcoholic beverages when taking this medication.    -- Indication: For dm2    amLODIPine 10 mg oral tablet  -- 1 tab(s) by mouth once a day  -- Indication: For Htn

## 2019-02-11 NOTE — PROGRESS NOTE ADULT - ASSESSMENT
89 yo male with Diabetes Mellitus , BPH admitted for altered mental status , hyperglycemia and UTI      Problem/Plan - 1:  ·  Problem: enterococcus Urinary tract infection without hematuria, site unspecified  --> c.,w po augmentin for 1more day      Problem/Plan - 2:  ·  Problem: metabolic encephalopahty in setting of mild dementia.  Plan: likely metabolic due to hyperglycemia and infection   almost at baseline as per daughter     Problem/Plan - 3:  ·  Problem: Type 2 diabetes mellitus with hyperglycemia, unspecified whether long term insulin use.  Plan: improved , BG stable   cont sliding scale insulin coverage.   --> refusing insulin on dc  wants to try oral meds     Problem/Plan - 4:  ·  Problem: CRF (chronic renal failure), stage 3 (moderate).    --> creatinine at baseline     Problem/Plan - 5:  ·  Problem: Enlarged prostate.  Plan: on flomax , voiding.     awaiting margie

## 2019-02-11 NOTE — DISCHARGE NOTE ADULT - HOSPITAL COURSE
89 year old male patient with PMHx T2DM on oral hypoglycemic, BPH resenting with chief complain of altered mental status. As per daughter, the patient lives alone and is typically independent, however the daughter has placed cameras in his house a few months ago to help monitor him.  Daughter reports pt has been presenting episodes of worsening confusion for the last week, characterized for waking up in the middle of the night thinking it was the morning or going to bed in morning ours thinking it was night time. Over the past few days, patient did not recognize where he was which was a change for him, so earlier today pt was taken by her daughter to an urgent care where hyperglycemia was noted and patient was referred to the ED.   Per daughter, pt has not reported chest pain, abdominal pain, dyspnea, diarrhea, vomiting, changes in frequency of urination, dysuria recently.     In ED, pt found to FS >530 with serum glucose of 647 without anion gap and with negative acetone patient admitted to medicine as a toxic metabolic encephalopathy seocndary to uti. patient started onj iv abx and went back to baseline. patients hemoglobin a1c was elevated but daugther wanted to try oral meds. patient is now at baseline and daugther requesting margie and primary care doctor referrals.     time spent on dc 31 minutes 89 year old male patient with PMHx T2DM on oral hypoglycemic, BPH resenting with chief complain of altered mental status. As per daughter, the patient lives alone and is typically independent, however the daughter has placed cameras in his house a few months ago to help monitor him.  Daughter reports pt has been presenting episodes of worsening confusion for the last week, characterized for waking up in the middle of the night thinking it was the morning or going to bed in morning ours thinking it was night time. Over the past few days, patient did not recognize where he was which was a change for him, so earlier today pt was taken by her daughter to an urgent care where hyperglycemia was noted and patient was referred to the ED.   Per daughter, pt has not reported chest pain, abdominal pain, dyspnea, diarrhea, vomiting, changes in frequency of urination, dysuria recently.     In ED, pt found to FS >530 with serum glucose of 647 without anion gap and with negative acetone patient admitted to medicine as a toxic metabolic encephalopathy seocndary to uti. patient started onj iv abx and went back to baseline. patients hemoglobin a1c was elevated but daugther wanted to try oral meds and did not want to start insulin given patients age. . patient is now at baseline and daugther requesting margie and primary care doctor referrals.     time spent on dc 31 minutes

## 2019-02-11 NOTE — PROGRESS NOTE ADULT - REASON FOR ADMISSION
Acute encephalopathy, suspected UTI

## 2019-02-12 VITALS
TEMPERATURE: 98 F | RESPIRATION RATE: 19 BRPM | HEART RATE: 84 BPM | OXYGEN SATURATION: 98 % | SYSTOLIC BLOOD PRESSURE: 138 MMHG | DIASTOLIC BLOOD PRESSURE: 62 MMHG

## 2019-02-12 LAB
GLUCOSE BLDC GLUCOMTR-MCNC: 163 MG/DL — HIGH (ref 70–99)
GLUCOSE BLDC GLUCOMTR-MCNC: 365 MG/DL — HIGH (ref 70–99)

## 2019-02-12 PROCEDURE — 83735 ASSAY OF MAGNESIUM: CPT

## 2019-02-12 PROCEDURE — 99239 HOSP IP/OBS DSCHRG MGMT >30: CPT

## 2019-02-12 PROCEDURE — 82550 ASSAY OF CK (CPK): CPT

## 2019-02-12 PROCEDURE — 84443 ASSAY THYROID STIM HORMONE: CPT

## 2019-02-12 PROCEDURE — 87186 SC STD MICRODIL/AGAR DIL: CPT

## 2019-02-12 PROCEDURE — 81001 URINALYSIS AUTO W/SCOPE: CPT

## 2019-02-12 PROCEDURE — 96372 THER/PROPH/DIAG INJ SC/IM: CPT | Mod: XU

## 2019-02-12 PROCEDURE — 83605 ASSAY OF LACTIC ACID: CPT

## 2019-02-12 PROCEDURE — 80048 BASIC METABOLIC PNL TOTAL CA: CPT

## 2019-02-12 PROCEDURE — 82803 BLOOD GASES ANY COMBINATION: CPT

## 2019-02-12 PROCEDURE — 80053 COMPREHEN METABOLIC PANEL: CPT

## 2019-02-12 PROCEDURE — 99285 EMERGENCY DEPT VISIT HI MDM: CPT | Mod: 25

## 2019-02-12 PROCEDURE — 85027 COMPLETE CBC AUTOMATED: CPT

## 2019-02-12 PROCEDURE — 83690 ASSAY OF LIPASE: CPT

## 2019-02-12 PROCEDURE — 96374 THER/PROPH/DIAG INJ IV PUSH: CPT

## 2019-02-12 PROCEDURE — 82962 GLUCOSE BLOOD TEST: CPT

## 2019-02-12 PROCEDURE — 97530 THERAPEUTIC ACTIVITIES: CPT

## 2019-02-12 PROCEDURE — 70450 CT HEAD/BRAIN W/O DYE: CPT

## 2019-02-12 PROCEDURE — 93005 ELECTROCARDIOGRAM TRACING: CPT

## 2019-02-12 PROCEDURE — 84100 ASSAY OF PHOSPHORUS: CPT

## 2019-02-12 PROCEDURE — 82009 KETONE BODYS QUAL: CPT

## 2019-02-12 PROCEDURE — 97110 THERAPEUTIC EXERCISES: CPT

## 2019-02-12 PROCEDURE — 83036 HEMOGLOBIN GLYCOSYLATED A1C: CPT

## 2019-02-12 PROCEDURE — 87086 URINE CULTURE/COLONY COUNT: CPT

## 2019-02-12 PROCEDURE — 36415 COLL VENOUS BLD VENIPUNCTURE: CPT

## 2019-02-12 PROCEDURE — 97116 GAIT TRAINING THERAPY: CPT

## 2019-02-12 RX ADMIN — ISOSORBIDE MONONITRATE 120 MILLIGRAM(S): 60 TABLET, EXTENDED RELEASE ORAL at 13:01

## 2019-02-12 RX ADMIN — Medication 1: at 08:25

## 2019-02-12 RX ADMIN — AMLODIPINE BESYLATE 10 MILLIGRAM(S): 2.5 TABLET ORAL at 05:34

## 2019-02-12 RX ADMIN — Medication 250 MILLIGRAM(S): at 05:34

## 2019-02-12 RX ADMIN — Medication 5: at 13:01

## 2019-02-12 RX ADMIN — HEPARIN SODIUM 5000 UNIT(S): 5000 INJECTION INTRAVENOUS; SUBCUTANEOUS at 13:02

## 2019-02-12 RX ADMIN — Medication 1 TABLET(S): at 05:34

## 2019-02-12 RX ADMIN — HEPARIN SODIUM 5000 UNIT(S): 5000 INJECTION INTRAVENOUS; SUBCUTANEOUS at 05:34

## 2019-02-13 ENCOUNTER — INBOUND DOCUMENT (OUTPATIENT)
Age: 84
End: 2019-02-13

## 2022-02-01 NOTE — ED ADULT TRIAGE NOTE - MEANS OF ARRIVAL
stretcher Ketoconazole Counseling:   Patient counseled regarding improving absorption with orange juice.  Adverse effects include but are not limited to breast enlargement, headache, diarrhea, nausea, upset stomach, liver function test abnormalities, taste disturbance, and stomach pain.  There is a rare possibility of liver failure that can occur when taking ketoconazole. The patient understands that monitoring of LFTs may be required, especially at baseline. The patient verbalized understanding of the proper use and possible adverse effects of ketoconazole.  All of the patient's questions and concerns were addressed.

## 2023-11-16 NOTE — ED ADULT NURSE NOTE - OBJECTIVE STATEMENT
patient had an unwitnessed fall. fall in the driveway while getting his newspaper. laceration to right forehead by eyebrow, right elbow, abrasion to right knee. patient is not on blood thinner. 145

## 2024-11-05 NOTE — ED ADULT NURSE NOTE - HARM RISK FACTORS
-- DO NOT REPLY / DO NOT REPLY ALL --  -- This inbox is not monitored. If this was sent to the wrong provider or department, reroute message to P ECO Reroute pool. --  -- Message is from Engagement Center Operations (ECO) --      Message Type:  Refill Medication   Refill request for Pended medication named: pended  Preferred pharmacy verified, and selected.   [facility] - [facility] [place], [place] - [address]    Is the patient OUT of Medication?  No Medication Refills handled by Care Site - route as routine priority to care site pool on KB. Patient has been advised it will be addressed within 2-3 business days.     Message: pt has a couple left of each                  
Ok to fill.  Prescription sent.    
oxyCODONE, IMM REL, (ROXICODONE) 5 MG immediate release tablet 20 tablet 0 10/15/2024 --    Sig - Route: Take 1 tablet by mouth every 6 hours as needed for Pain. - Oral       Disp Refills Start End    cyclobenzaprine (FLEXERIL) 5 MG tablet 30 tablet 0 10/15/2024 --    Sig - Route: Take 1 tablet by mouth 3 times daily as needed for Muscle spasms. - Oral      LORazepam (ATIVAN) 0.5 MG tablet 40 tablet 0 10/11/2024 --    Sig - Route: Take 1 tablet by mouth every 6 hours as needed for Anxiety. Begin taking on October 11, 2024. - Oral      Date medication contract signed: 11/29/22  Date of last urine drug screen: none  Result:  compliant  Last 3 PDMP refills: oxycodone 10/15/24- 5 days  Lorazepam- 10/11/24- 10 day     PDMP review: Criteria met. Forwarded to Physician/MARGRET for signature.  Can not refill without MD authorization  
no